# Patient Record
Sex: FEMALE | Race: WHITE | NOT HISPANIC OR LATINO | Employment: OTHER | URBAN - METROPOLITAN AREA
[De-identification: names, ages, dates, MRNs, and addresses within clinical notes are randomized per-mention and may not be internally consistent; named-entity substitution may affect disease eponyms.]

---

## 2017-08-25 ENCOUNTER — TELEPHONE (OUTPATIENT)
Dept: FAMILY MEDICINE | Facility: CLINIC | Age: 67
End: 2017-08-25

## 2017-08-25 ENCOUNTER — PATIENT MESSAGE (OUTPATIENT)
Dept: ADMINISTRATIVE | Facility: OTHER | Age: 67
End: 2017-08-25

## 2017-08-25 DIAGNOSIS — Z00.00 ROUTINE GENERAL MEDICAL EXAMINATION AT A HEALTH CARE FACILITY: Primary | ICD-10-CM

## 2017-08-25 NOTE — TELEPHONE ENCOUNTER
----- Message from Koki Rodriguez sent at 8/25/2017  7:32 AM CDT -----  Physical:  11/15  Labs:11/8, Please link annual fasting labs to appt

## 2017-10-31 ENCOUNTER — PATIENT MESSAGE (OUTPATIENT)
Dept: FAMILY MEDICINE | Facility: CLINIC | Age: 67
End: 2017-10-31

## 2017-12-04 ENCOUNTER — LAB VISIT (OUTPATIENT)
Dept: LAB | Facility: HOSPITAL | Age: 67
End: 2017-12-04
Attending: FAMILY MEDICINE
Payer: COMMERCIAL

## 2017-12-04 DIAGNOSIS — Z00.00 ROUTINE GENERAL MEDICAL EXAMINATION AT A HEALTH CARE FACILITY: ICD-10-CM

## 2017-12-04 LAB
ALBUMIN SERPL BCP-MCNC: 3.4 G/DL
ALP SERPL-CCNC: 61 U/L
ALT SERPL W/O P-5'-P-CCNC: 14 U/L
ANION GAP SERPL CALC-SCNC: 9 MMOL/L
AST SERPL-CCNC: 12 U/L
BASOPHILS # BLD AUTO: 0.03 K/UL
BASOPHILS NFR BLD: 0.5 %
BILIRUB SERPL-MCNC: 0.3 MG/DL
BUN SERPL-MCNC: 17 MG/DL
CALCIUM SERPL-MCNC: 9.4 MG/DL
CHLORIDE SERPL-SCNC: 108 MMOL/L
CHOLEST SERPL-MCNC: 200 MG/DL
CHOLEST/HDLC SERPL: 4 {RATIO}
CO2 SERPL-SCNC: 24 MMOL/L
CREAT SERPL-MCNC: 0.7 MG/DL
DIFFERENTIAL METHOD: ABNORMAL
EOSINOPHIL # BLD AUTO: 0.2 K/UL
EOSINOPHIL NFR BLD: 3.2 %
ERYTHROCYTE [DISTWIDTH] IN BLOOD BY AUTOMATED COUNT: 14 %
EST. GFR  (AFRICAN AMERICAN): >60 ML/MIN/1.73 M^2
EST. GFR  (NON AFRICAN AMERICAN): >60 ML/MIN/1.73 M^2
GLUCOSE SERPL-MCNC: 93 MG/DL
HCT VFR BLD AUTO: 34.9 %
HDLC SERPL-MCNC: 50 MG/DL
HDLC SERPL: 25 %
HGB BLD-MCNC: 11.6 G/DL
IMM GRANULOCYTES # BLD AUTO: 0.01 K/UL
IMM GRANULOCYTES NFR BLD AUTO: 0.2 %
LDLC SERPL CALC-MCNC: 133.2 MG/DL
LYMPHOCYTES # BLD AUTO: 2.4 K/UL
LYMPHOCYTES NFR BLD: 39.3 %
MCH RBC QN AUTO: 31.6 PG
MCHC RBC AUTO-ENTMCNC: 33.2 G/DL
MCV RBC AUTO: 95 FL
MONOCYTES # BLD AUTO: 0.5 K/UL
MONOCYTES NFR BLD: 8 %
NEUTROPHILS # BLD AUTO: 2.9 K/UL
NEUTROPHILS NFR BLD: 48.8 %
NONHDLC SERPL-MCNC: 150 MG/DL
NRBC BLD-RTO: 0 /100 WBC
PLATELET # BLD AUTO: 262 K/UL
PMV BLD AUTO: 10.4 FL
POTASSIUM SERPL-SCNC: 3.9 MMOL/L
PROT SERPL-MCNC: 7.1 G/DL
RBC # BLD AUTO: 3.67 M/UL
SODIUM SERPL-SCNC: 141 MMOL/L
TRIGL SERPL-MCNC: 84 MG/DL
TSH SERPL DL<=0.005 MIU/L-ACNC: 0.44 UIU/ML
WBC # BLD AUTO: 6.01 K/UL

## 2017-12-04 PROCEDURE — 84443 ASSAY THYROID STIM HORMONE: CPT

## 2017-12-04 PROCEDURE — 80061 LIPID PANEL: CPT

## 2017-12-04 PROCEDURE — 80053 COMPREHEN METABOLIC PANEL: CPT

## 2017-12-04 PROCEDURE — 85025 COMPLETE CBC W/AUTO DIFF WBC: CPT

## 2017-12-04 PROCEDURE — 36415 COLL VENOUS BLD VENIPUNCTURE: CPT | Mod: PO

## 2017-12-12 DIAGNOSIS — Z00.00 ROUTINE GENERAL MEDICAL EXAMINATION AT A HEALTH CARE FACILITY: Primary | ICD-10-CM

## 2018-02-01 ENCOUNTER — PATIENT MESSAGE (OUTPATIENT)
Dept: FAMILY MEDICINE | Facility: CLINIC | Age: 68
End: 2018-02-01

## 2018-02-01 ENCOUNTER — OFFICE VISIT (OUTPATIENT)
Dept: FAMILY MEDICINE | Facility: CLINIC | Age: 68
End: 2018-02-01
Payer: COMMERCIAL

## 2018-02-01 VITALS — SYSTOLIC BLOOD PRESSURE: 160 MMHG | HEART RATE: 76 BPM | TEMPERATURE: 98 F | DIASTOLIC BLOOD PRESSURE: 100 MMHG

## 2018-02-01 DIAGNOSIS — Z00.00 ROUTINE GENERAL MEDICAL EXAMINATION AT A HEALTH CARE FACILITY: Primary | ICD-10-CM

## 2018-02-01 DIAGNOSIS — J06.9 UPPER RESPIRATORY TRACT INFECTION, UNSPECIFIED TYPE: ICD-10-CM

## 2018-02-01 DIAGNOSIS — Z23 NEED FOR PROPHYLACTIC VACCINATION AND INOCULATION AGAINST INFLUENZA: ICD-10-CM

## 2018-02-01 DIAGNOSIS — M85.80 OSTEOPENIA, UNSPECIFIED LOCATION: ICD-10-CM

## 2018-02-01 DIAGNOSIS — I10 HTN (HYPERTENSION), BENIGN: ICD-10-CM

## 2018-02-01 DIAGNOSIS — Z28.39 IMMUNIZATION DEFICIENCY: ICD-10-CM

## 2018-02-01 DIAGNOSIS — Z85.850 HX OF THYROID CANCER: ICD-10-CM

## 2018-02-01 DIAGNOSIS — Z91.89 FRAMINGHAM CARDIAC RISK 10-20% IN NEXT 10 YEARS: ICD-10-CM

## 2018-02-01 DIAGNOSIS — I11.9 HYPERTENSIVE LEFT VENTRICULAR HYPERTROPHY, WITHOUT HEART FAILURE: ICD-10-CM

## 2018-02-01 PROCEDURE — 99397 PER PM REEVAL EST PAT 65+ YR: CPT | Mod: S$GLB,,, | Performed by: FAMILY MEDICINE

## 2018-02-01 PROCEDURE — 93010 ELECTROCARDIOGRAM REPORT: CPT | Mod: S$GLB,,, | Performed by: INTERNAL MEDICINE

## 2018-02-01 PROCEDURE — 93005 ELECTROCARDIOGRAM TRACING: CPT | Mod: S$GLB,,, | Performed by: FAMILY MEDICINE

## 2018-02-01 PROCEDURE — 99999 PR PBB SHADOW E&M-EST. PATIENT-LVL III: CPT | Mod: PBBFAC,,, | Performed by: FAMILY MEDICINE

## 2018-02-01 PROCEDURE — 90662 IIV NO PRSV INCREASED AG IM: CPT | Mod: S$GLB,,, | Performed by: FAMILY MEDICINE

## 2018-02-01 PROCEDURE — 90471 IMMUNIZATION ADMIN: CPT | Mod: S$GLB,,, | Performed by: FAMILY MEDICINE

## 2018-02-01 PROCEDURE — 90670 PCV13 VACCINE IM: CPT | Mod: S$GLB,,, | Performed by: FAMILY MEDICINE

## 2018-02-01 PROCEDURE — 90472 IMMUNIZATION ADMIN EACH ADD: CPT | Mod: S$GLB,,, | Performed by: FAMILY MEDICINE

## 2018-02-01 RX ORDER — NAPROXEN SODIUM 220 MG/1
81 TABLET, FILM COATED ORAL DAILY
Refills: 0 | COMMUNITY
Start: 2018-02-01 | End: 2022-06-23

## 2018-02-01 RX ORDER — LOSARTAN POTASSIUM 50 MG/1
50 TABLET ORAL DAILY
Qty: 90 TABLET | Refills: 0 | Status: SHIPPED | OUTPATIENT
Start: 2018-02-01 | End: 2018-04-16 | Stop reason: SDUPTHER

## 2018-02-01 NOTE — PROGRESS NOTES
Subjective:      Patient ID: Nurys Do is a 67 y.o. female.    Chief Complaint: Annual Exam    Here today for general exam.     Her BP has been elevated on several occassions & with recent R foot surgery, but never treated before with HTN medication. She does feel a little fatigued.     She is not exercising after her R foot surgery 2 months ago with Dr Garcia    Over the past few weeks she has mild postnasal drip, congestion, no fever chills cough    Denies any chest pain, shortness of breath,  nausea vomiting constipation diarrhea, blood in stool, heartburn    The 10-year ASCVD risk score (Adeshannon LEMUS Jr., et al., 2013) is: 17.6%    Values used to calculate the score:      Age: 67 years      Sex: Female      Is Non- : No      Diabetic: No      Tobacco smoker: No      Systolic Blood Pressure: 160 mmHg      Is BP treated: Yes      HDL Cholesterol: 50 mg/dL      Total Cholesterol: 200 mg/dL      No results found for: HGBA1C  No results found for: MICALBCREAT  Lab Results   Component Value Date    LDLCALC 133.2 12/04/2017    LDLCALC 118.8 10/30/2015    CHOL 200 (H) 12/04/2017    HDL 50 12/04/2017    TRIG 84 12/04/2017       Lab Results   Component Value Date     12/04/2017    K 3.9 12/04/2017     12/04/2017    CO2 24 12/04/2017    GLU 93 12/04/2017    BUN 17 12/04/2017    CREATININE 0.7 12/04/2017    CALCIUM 9.4 12/04/2017    PROT 7.1 12/04/2017    ALBUMIN 3.4 (L) 12/04/2017    BILITOT 0.3 12/04/2017    ALKPHOS 61 12/04/2017    AST 12 12/04/2017    ALT 14 12/04/2017    ANIONGAP 9 12/04/2017    ESTGFRAFRICA >60.0 12/04/2017    EGFRNONAA >60.0 12/04/2017    WBC 6.01 12/04/2017    HGB 11.6 (L) 12/04/2017    HCT 34.9 (L) 12/04/2017    MCV 95 12/04/2017     12/04/2017    TSH 0.440 12/04/2017    [id number] 33 11/18/2013         Current Outpatient Prescriptions on File Prior to Visit   Medication Sig    fluticasone (FLONASE) 50 mcg/actuation nasal spray instill 2 sprays into  each nostril once daily    levothyroxine (SYNTHROID) 200 MCG tablet Take 1 tablet (200 mcg total) by mouth before breakfast.     No current facility-administered medications on file prior to visit.      Past Medical History:   Diagnosis Date    Diverticulosis     colonoscopy 2011, Dr Guerrier    [place] cardiac risk 10-20% in next 10 years 2/1/2018    HTN (hypertension), benign 2/1/2018    Hypertensive left ventricular hypertrophy, without heart failure 2/1/2018    Knee pain, left     eval in PT with pes planus, L foot, hip & knee pain, Dr Tirado takes Meloxicam 15 mg daily    Osteopenia 1/6/2016    Thyroid cancer     Dr Quinn     Past Surgical History:   Procedure Laterality Date    right foot surgery  2017    Dr Garcia    THYROIDECTOMY      x2, Dr Quinn     Social History     Social History Narrative    , 5 children, nonsmoker, ETOH socially, GYN here nml 2014, colonoscopy with diverticulosis 2011 Dr Guerrier     Family History   Problem Relation Age of Onset    Hypertension Mother     Dementia Mother     Colon cancer Father     Heart disease Father      Vitals:    02/01/18 0910 02/01/18 0946   BP: (!) 162/96 (!) 160/100   Pulse: 76    Temp: 98.4 °F (36.9 °C)      Objective:   Physical Exam   Constitutional: She is oriented to person, place, and time. She appears well-developed and well-nourished. No distress.   HENT:   Head: Normocephalic and atraumatic.   Right Ear: Tympanic membrane and external ear normal.   Left Ear: Tympanic membrane and external ear normal.   Nose: Nose normal. Right sinus exhibits no maxillary sinus tenderness and no frontal sinus tenderness. Left sinus exhibits no maxillary sinus tenderness and no frontal sinus tenderness.   Mouth/Throat: Oropharynx is clear and moist and mucous membranes are normal. No oropharyngeal exudate.   Eyes: EOM are normal. Pupils are equal, round, and reactive to light.   Neck: Normal range of motion. Neck supple. No thyromegaly present.    Cardiovascular: Normal rate, regular rhythm, normal heart sounds and intact distal pulses.    No murmur heard.  Pulmonary/Chest: Effort normal and breath sounds normal. She has no wheezes. She has no rales.   Abdominal: Soft. Bowel sounds are normal. She exhibits no distension and no mass. There is no tenderness. There is no rebound and no guarding.   Musculoskeletal: She exhibits no edema.   R foot in walking boot   Lymphadenopathy:     She has no cervical adenopathy.   Neurological: She is alert and oriented to person, place, and time.   Skin: Skin is warm and dry.   Psychiatric: She has a normal mood and affect. Her behavior is normal.   Nursing note and vitals reviewed.    Assessment:     1. Routine general medical examination at a health care facility    2. HTN (hypertension), benign    3. Hx of thyroid cancer    4. Osteopenia, unspecified location    5. [place] cardiac risk 10-20% in next 10 years    6. Upper respiratory tract infection, unspecified type    7. Immunization deficiency    8. Need for prophylactic vaccination and inoculation against influenza    9. Hypertensive left ventricular hypertrophy, without heart failure      Plan:     Orders Placed This Encounter    (In Office Administered) Pneumococcal Conjugate Vaccine (13 Valent) (IM)    Flu Vaccine - High Dose (PF) (65+)    Ambulatory referral to Cardiology    EKG 12-lead    losartan (COZAAR) 50 MG tablet    aspirin 81 MG Chew     Follow with Endocrinologist  Patient Instructions   See me one month for BP check on new med    Due for  Vaccines  - tetanus, pneumonia, flu    Can use Flonase twice a day and plain Claritin at night.     ==============================================================  ==============================================  FOR HIGH BLOOD PRESSURE (HYPERTENSION)-------------    Take your Blood pressure medication every day     Try to stop these things that can elevate blood pressuere: salt, caffeine, energy drinks, diet  pills, sudafed, alcohol , taking NSAIDS daily (advil, alleve, ibuprofen, naproxen) and birth control    DECREASE ALCOHOL CONSUMPTION    DECREASE SALT (fast foods, frozen, canned, processed foods, ham, turkey, fried foods, chips, crackers, etc) & drink 8 glasses of water a day with minimal caffeine ( 1 cup a day)   ==============================================    ==========================  Your 10 year risk of future heart attack / stroke ([place] risk)  - is 13.5%    Goal is < 10%    If your risk is > 10%, I  recommend statin medication daily (lipitor, pravachol, crestor)     ACC/AHA , ages 40 to 75 , treat if ?7.5% risk  [place]'s National [place] for Health and Clinical Excellence (NICE) recommend statin medication (lipitor, pravachol, crestor) , treat if > 10%     ====================    Increase FIBER INTAKE - your body is happiest with FIVE FRESH COLORS of fruits and  vegetables DAILY    With respect to FIBER intake, you should have 5-10 grams of viscous soluble fiber daily. This  Includes fruit (bananas, apples, pears, blackberries, citrus, peaches, plums, nectarines), whole grains (oatmeal, oat bran, all-bran cereal, granola, shredded wheat, wheat germ, henry barley, brown rice), legumes (northern/ayala/navy/lima/kidney/black beans, peas, lentils), seeds (psyllium), and vegetables (carrots, broccoli, brussels sprouts, artichokes).     -------------------------------------------------------------------------------------------------------    Consider watching the movies to learn how our body processes American food:    FORKS OVER KNIVES    FAT SICK and NEARLY DEAD     SUPERSIZE ME    BOOK RECOMMENDATIONS: Prevent and Reverse Heart Disease,  by Mery Uribe MD      ================  I'd like to advise you on current CANCER SCREENING recommendations:  ~~~~~~~~~~~~~~~~~~~~~~~~~~~~~~~~~~~~~~~~~~~~~~~~~~~~~~~~~~~~~   If all previous PAP SMEARS have been normal, no current problems, and no  family history of female cancers, it is recommended to have Pap smear every 3 years (or after 29 yo, every 5 years with HPV co-testing).   MAMMOGRAM  every 1-2 years, from 50 - 74 years old. We can discuss your risk at 40 & determine whether to get mammogram sooner  Of course, I can perform this sooner if there is family history of cancer, or if you have problems or questions    RECOMMENDATIONS FOR FEMALES    Prevent the #1 cause of death- cardiovascular disease (HEART ATTACK & STROKE) by checking for normal blood pressure, cholesterol, sugars, & by not smoking.     VACCINES: Yearly FLU shot, ONE PNEUMONIA shot after 65, ONE SHINGLES shot after 60    Screening colonoscopy at AGE  50 & every 10 years to check for COLON CANCER,  one of the most common & preventable cancers. Or FIT z12.11, Repeat in 3 years if POLYP found     I recommend  high fiber (5 fresh fruits or vegetables daily), low fat diet and aerobic  exercise (huffing/ puffing/ sweating for 20 min straight at least 4 days a week)    Follow up yearly with mammogram (every 2 years) , fasting lipids, CMP, CBC, TSH prior.   ==============================================================                                  Answers for HPI/ROS submitted by the patient on 1/29/2018   activity change: Yes  unexpected weight change: Yes  neck pain: No  hearing loss: No  rhinorrhea: No  trouble swallowing: No  eye discharge: No  visual disturbance: No  chest tightness: No  wheezing: No  chest pain: No  palpitations: Yes  blood in stool: No  constipation: No  vomiting: No  diarrhea: No  polydipsia: No  difficulty urinating: No  hematuria: No  menstrual problem: No  dysuria: No  joint swelling: Yes  arthralgias: Yes  headaches: No  weakness: Yes  confusion: No  dysphoric mood: No

## 2018-02-01 NOTE — PROGRESS NOTES
Two patient identifiers used, allergies reviewed, vaccines confirmed.  Prevnar/13 pneumococcal conjugate vaccine administered IM right deltoid.  Flu consent signed by patient. Flu vaccine administered IM left deltoid.  Pt tolerated both injections well, no redness or bruising at either injection site.  Pt advised to remain in clinic 15 mins following injections for observation.

## 2018-02-01 NOTE — PATIENT INSTRUCTIONS
See me one month for BP check on new med    Due for  Vaccines  - tetanus, pneumonia, flu    Can use Flonase twice a day and plain Claritin at night.     ==============================================================  ==============================================  FOR HIGH BLOOD PRESSURE (HYPERTENSION)-------------    Take your Blood pressure medication every day     Try to stop these things that can elevate blood pressuere: salt, caffeine, energy drinks, diet pills, sudafed, alcohol , taking NSAIDS daily (advil, alleve, ibuprofen, naproxen) and birth control    DECREASE ALCOHOL CONSUMPTION    DECREASE SALT (fast foods, frozen, canned, processed foods, ham, turkey, fried foods, chips, crackers, etc) & drink 8 glasses of water a day with minimal caffeine ( 1 cup a day)   ==============================================    ==========================  Your 10 year risk of future heart attack / stroke ([place] risk)  - is 13.5%    Goal is < 10%    If your risk is > 10%, I  recommend statin medication daily (lipitor, pravachol, crestor)     ACC/AHA , ages 40 to 75 , treat if ?7.5% risk  [place]'s National [place] for Health and Clinical Excellence (NICE) recommend statin medication (lipitor, pravachol, crestor) , treat if > 10%     ====================    Increase FIBER INTAKE - your body is happiest with FIVE FRESH COLORS of fruits and  vegetables DAILY    With respect to FIBER intake, you should have 5-10 grams of viscous soluble fiber daily. This  Includes fruit (bananas, apples, pears, blackberries, citrus, peaches, plums, nectarines), whole grains (oatmeal, oat bran, all-bran cereal, granola, shredded wheat, wheat germ, henry barley, brown rice), legumes (northern/ayala/navy/lima/kidney/black beans, peas, lentils), seeds (psyllium), and vegetables (carrots, broccoli, brussels sprouts, artichokes).      -------------------------------------------------------------------------------------------------------    Consider watching the movies to learn how our body processes American food:    FORKS OVER KNIVES    FAT SICK and NEARLY DEAD     SUPERSIZE ME    BOOK RECOMMENDATIONS: Prevent and Reverse Heart Disease,  by Mery Uribe MD      ================  I'd like to advise you on current CANCER SCREENING recommendations:  ~~~~~~~~~~~~~~~~~~~~~~~~~~~~~~~~~~~~~~~~~~~~~~~~~~~~~~~~~~~~~   If all previous PAP SMEARS have been normal, no current problems, and no family history of female cancers, it is recommended to have Pap smear every 3 years (or after 31 yo, every 5 years with HPV co-testing).   MAMMOGRAM  every 1-2 years, from 50 - 74 years old. We can discuss your risk at 40 & determine whether to get mammogram sooner  Of course, I can perform this sooner if there is family history of cancer, or if you have problems or questions    RECOMMENDATIONS FOR FEMALES    Prevent the #1 cause of death- cardiovascular disease (HEART ATTACK & STROKE) by checking for normal blood pressure, cholesterol, sugars, & by not smoking.     VACCINES: Yearly FLU shot, ONE PNEUMONIA shot after 65, ONE SHINGLES shot after 60    Screening colonoscopy at AGE  50 & every 10 years to check for COLON CANCER,  one of the most common & preventable cancers. Or FIT z12.11, Repeat in 3 years if POLYP found     I recommend  high fiber (5 fresh fruits or vegetables daily), low fat diet and aerobic  exercise (huffing/ puffing/ sweating for 20 min straight at least 4 days a week)    Follow up yearly with mammogram (every 2 years) , fasting lipids, CMP, CBC, TSH prior.   ==============================================================

## 2018-02-15 ENCOUNTER — OFFICE VISIT (OUTPATIENT)
Dept: CARDIOLOGY | Facility: CLINIC | Age: 68
End: 2018-02-15
Payer: COMMERCIAL

## 2018-02-15 VITALS
DIASTOLIC BLOOD PRESSURE: 90 MMHG | SYSTOLIC BLOOD PRESSURE: 134 MMHG | HEIGHT: 62 IN | HEART RATE: 80 BPM | BODY MASS INDEX: 37.3 KG/M2 | WEIGHT: 202.69 LBS

## 2018-02-15 DIAGNOSIS — Z91.89 AT RISK FOR CORONARY ARTERY DISEASE: Primary | ICD-10-CM

## 2018-02-15 DIAGNOSIS — E78.00 HYPERCHOLESTEREMIA: ICD-10-CM

## 2018-02-15 DIAGNOSIS — R06.02 SHORT OF BREATH ON EXERTION: ICD-10-CM

## 2018-02-15 DIAGNOSIS — R94.31 ABNORMAL ECG: ICD-10-CM

## 2018-02-15 DIAGNOSIS — I10 HYPERTENSION, ESSENTIAL: ICD-10-CM

## 2018-02-15 PROCEDURE — 99244 OFF/OP CNSLTJ NEW/EST MOD 40: CPT | Mod: S$GLB,,, | Performed by: INTERNAL MEDICINE

## 2018-02-15 PROCEDURE — 99999 PR PBB SHADOW E&M-EST. PATIENT-LVL III: CPT | Mod: PBBFAC,,, | Performed by: INTERNAL MEDICINE

## 2018-02-15 NOTE — Clinical Note
Dear Meeta, Thank you for referring Nurys Do  to the [facility] at [place]. My note is attached. I'd like her to get a coronary calcium score. Homeyaarturo

## 2018-02-15 NOTE — PROGRESS NOTES
"Subjective:     Patient ID:  Nurys Do is a 67 y.o. female who presents for evaluation of Hypertension      Problem List:  Hypertension  Overweight BMI >35    HPI:     Nurys Do is Romie Do' wife. She is being referred by Dr. Maciel. She is being referred for evaluation of hypertension.  Blood pressure was 160/100 mm Hg in Dr. Maciel's office recently. She was prescribed losartan 50 mg 5 days ago. She recalls that her BP was 190 mm Hg in 11/17 while she was having ankle and foot surgery. She was taking Percocet but it made her sick and had switched to ibuprofen 400 mg bid for 10 days.   She reports heart burn described as pressure. It occurs a "couple of times a year". Fried foods and sugar seem to bring it on. She reports shortness of breath on exertion since the summer of 2017. 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 She was swimming during the summer. She was unable to exercise because of pain in her ankle. Weight has increased from 187 to 202 lbs (todays yovani includes an orthopedic ankle boot).  Total cholesterol has been 185-200 with an HDL in the low 50s and LDL of 119-133 mg/dl.  Her 10 year risk of MI was calculated to be 17%.  She does not take a statin.  Recent ECG revealed sinus rhythm with LVH and a Q wave in lead III.      Review of Systems   Constitution: Positive for weakness and weight gain. Negative for malaise/fatigue and weight loss.   HENT: Negative for hearing loss and nosebleeds.    Cardiovascular: Positive for dyspnea on exertion and leg swelling. Negative for chest pain, claudication, irregular heartbeat, near-syncope, palpitations and syncope.   Respiratory: Negative for cough, hemoptysis, sputum production and wheezing.    Hematologic/Lymphatic: Does not bruise/bleed easily.   Musculoskeletal: Positive for arthritis, muscle cramps and muscle weakness. Negative for back pain, joint pain and joint swelling. " "  Gastrointestinal: Positive for heartburn. Negative for abdominal pain, change in bowel habit and melena.   Genitourinary: Positive for frequency and nocturia. Negative for hematuria.   Neurological: Negative for dizziness, headaches, light-headedness, loss of balance, numbness and paresthesias.   Psychiatric/Behavioral: Negative for depression. The patient is not nervous/anxious.          Current Outpatient Prescriptions   Medication Sig    aspirin 81 MG Chew Take 1 tablet (81 mg total) by mouth once daily.    fluticasone (FLONASE) 50 mcg/actuation nasal spray instill 2 sprays into each nostril once daily    levothyroxine (SYNTHROID) 200 MCG tablet Take 1 tablet (200 mcg total) by mouth before breakfast.    losartan (COZAAR) 50 MG tablet Take 1 tablet (50 mg total) by mouth once daily.     No current facility-administered medications for this visit.          Past Medical History:   Diagnosis Date    Diverticulosis     colonoscopy 2011, Dr Guerrier    [place] cardiac risk 10-20% in next 10 years 2/1/2018    HTN (hypertension), benign 2/1/2018    Hypertensive left ventricular hypertrophy, without heart failure 2/1/2018    Knee pain, left     eval in PT with pes planus, L foot, hip & knee pain, Dr Tirado takes Meloxicam 15 mg daily    Osteopenia 1/6/2016    Thyroid cancer     Dr Quinn         Social History   Substance Use Topics    Smoking status: Never Smoker    Smokeless tobacco: Never Used    Alcohol use Yes         Family History   Problem Relation Age of Onset    Hypertension Mother     Dementia Mother     Colon cancer Father     Heart disease Father          Objective:     Physical Exam   Constitutional: She is oriented to person, place, and time. She appears well-developed and well-nourished.   BP (!) 134/90   Pulse 80   Ht 5' 2" (1.575 m)   Wt 92 kg (202 lb 11.4 oz)   BMI 37.08 kg/m²  - todays yovani includes an orthopedic ankle boot.     HENT:   Head: Normocephalic.   Neck: No JVD " present. Carotid bruit is not present.   Cardiovascular: Normal rate, regular rhythm, S1 normal and S2 normal.  Exam reveals no gallop.    No murmur heard.  Pulses:       Radial pulses are 2+ on the right side, and 2+ on the left side.        Posterior tibial pulses are 1+ on the right side, and 1+ on the left side.   Pulmonary/Chest: Effort normal. She has no wheezes. She has no rales. Chest wall is not dull to percussion.   Abdominal: Soft. She exhibits no abdominal bruit. There is no splenomegaly or hepatomegaly. There is no tenderness.   Musculoskeletal:        Right lower leg: She exhibits no edema.        Left lower leg: She exhibits no edema.   Neurological: She is alert and oriented to person, place, and time. Gait normal.   Skin: Skin is warm and dry. No bruising and no rash noted. No cyanosis. Nails show no clubbing.   Psychiatric: She has a normal mood and affect. Her speech is normal and behavior is normal. Judgment normal. Cognition and memory are normal.         Lab Results   Component Value Date    CHOL 200 (H) 12/04/2017    CHOL 185 10/30/2015    CHOL 206 (H) 11/18/2013     Lab Results   Component Value Date    HDL 50 12/04/2017    HDL 51 10/30/2015    HDL 55 11/18/2013     Lab Results   Component Value Date    LDLCALC 133.2 12/04/2017    LDLCALC 118.8 10/30/2015    LDLCALC 135.4 11/18/2013     Lab Results   Component Value Date    TRIG 84 12/04/2017    TRIG 76 10/30/2015    TRIG 78 11/18/2013     Lab Results   Component Value Date    CHOLHDL 25.0 12/04/2017    CHOLHDL 27.6 10/30/2015    CHOLHDL 26.7 11/18/2013     Lab Results   Component Value Date    ALT 14 12/04/2017     Lab Results   Component Value Date    ALT 14 12/04/2017    AST 12 12/04/2017    ALKPHOS 61 12/04/2017    BILITOT 0.3 12/04/2017      Lab Results   Component Value Date    CREATININE 0.7 12/04/2017    BUN 17 12/04/2017     12/04/2017    K 3.9 12/04/2017     12/04/2017    CO2 24 12/04/2017       Recent ECG revealed sinus  rhythm with LVH and a Q wave in lead III.      Assessment and Plan:     Coronary risk factors  -     She should take a statin. I would modify the dose based on a coronary calcium score.   CT Calcium Scoring Cardiac; Future    Short of breath on exertion and Abnormal ECG - Q in lead III   R/O obstructive CAD, pulmonary hypertension, diastolic dysfunction, LV dysfunction  -     Dobutamine Stress Test w/ Color Flow; Future    Hypercholesteremia   As above.    Hypertension, essential   SBP goal <130 mm Hg. check blood pressure at home and review in 2-3 weeks.       Follow-up in about 1 year (around 2/15/2019).

## 2018-02-15 NOTE — LETTER
February 18, 2018      Meeta Maciel MD  [address]  [address]  [facility] [address]           [place] [facility] Cardiology  [address] [place] [address]  Phone: [phone]          Patient: Nurys Do   MR Number: [id number]   YOB: 1950   Date of Visit: 2/15/2018       Dear Dr. Meeta Maciel:    Thank you for referring Nurys Do to me for evaluation. Attached you will find relevant portions of my assessment and plan of care.    If you have questions, please do not hesitate to call me. I look forward to following Nurys Do along with you.    Sincerely,    Mindy Ellsworth MD    Enclosure  CC:  No Recipients    If you would like to receive this communication electronically, please contact [email or web address] or [phone] to request more information on [facility] access.    For providers and/or their staff who would like to refer a patient to [facility], please contact us through our one-stop-shop provider referral line, [facility] , at [phone].    If you feel you have received this communication in error or would no longer like to receive these types of communications, please e-mail [email or web address]

## 2018-02-15 NOTE — PATIENT INSTRUCTIONS
Omron series 5, 7 or 10 that fits on the arm and has a wide range D ring cuff.  Check your BP at home for 2-3 weeks and send us the readings.  Ideally most SBP should be <130 mm Hg.  Based on a compilation of your risk factors, you should take a statin and low dose aspirin. Lets wait and see what the tests show.     ================    If you have severe pain you can take NSAIDs for a short period of times: Ibuprofen, Advil, Motrin, Aleeve, Naproxen, Meloxicam, Mobic, Diclofenac and Voltaren.  Aleeve (naproxen) is safer from the cardiac stand point but should be avoided if kidney function is not normal.  Celebrex is a prescription REYES 2 inhibitor similar to NSAIDs- same restrictions apply.  Make sure you drink extra water on the days that you take the NSAIDs or Celebrex. Check above the ankles to make sure you are not retaining fluid.  Also do not take NSAIDs at the same time as aspirin - it may make aspirin less effective.  You can take Tylenol for pain. It is not a NSAID. Limit Tylenol to 4 tabs (500 mg each) in a 24 hr period.     ==============    LDL - bad type - improves with diet and medications: typically statins; most other medications that lower LDL have not been proven to prevent heart attacks.  May not improve significantly with exercise alone.  Ideally less than 100 mg/dl. If you have coronary artery disease, this should be well below 70 mg/dl.    HDL - good type - improves with exercise.  Ideally greater than 50 mg/dl.    TGs (triglycerides) - also bad - can change very quickly and considerably with certain foods. Improve with diet and exercise  In some cases a low carbohydrate diet will lower TGs better than a low fat diet.  Ideal range  mg/dl.    Sugar, fat and cholesterol in food:     A sensible diet that limits the intake of sugars, saturated (bad) fats and trans fats while increasing the intake of unsaturated (good)  fats and plant proteins is the basis of the current dietary  recommendations.      We now recommend drastically reducing the intake of sugar. There is less emphasis on excluding fat.     Cholesterol in our food is no longer a significant concern, mainly because it is generally present in small amounts. However please do not confuse this with the role of cholesterol in our blood and arteries. Make no mistake, it is cholesterol that clogs up our arteries whether it comes from our food or is manufactured by our bodies.       Most foods that are high in cholesterol are also high in saturated fat. But there is way more saturated fat than cholesterol in these foods. So its the saturated fat content that matters more than cholesterol. On the other hand there are a handful of foods that are high in cholesterol but do not contain much saturated fat: eggs, shrimp, crab legs and crawfish are OK to eat.       Saturated fat is the bad fat - you should limit your intake of this. Deep fried foods, meats and other animal fats are high in saturated fat. Cookies, donuts and most dessert and cakes are usually high in both saturated fat and sugar.       Unsaturated fat is the good fat. It contains the same number of calories as saturated fat but these fats do not get deposited in our arteries. The Mediterranean style diet encourages the intake of unsaturated fat - olive oil, avocado and unsalted nuts. So instead of baking a piece of fish, it may be better to pan-chaves it using olive oil.     You should eat a few servings of vegetables (and fruit as long as you are not diabetic) everyday. Substitute some plant proteins in place of meat: beans, lentils, quinoa and oatmeal. They are lean proteins.     Do not use stick butter or stick margarine. Butter that comes in a tub is soft butter. It consists of 1/2 butter and 1/2 vegetable oil., either canola or olive oil It is fine to use that.       Trans fats should definitely be avoided. Most foods that are labelled as containing 0 gms of trans fat can  still contain several hundred milligrams of trans fat: creamer, margarine, refrigerator dough, deep fried foods, ready made frosting, potato, corn and torilla chips, cakes, cookies, pie crusts and crackers containing shortening made with hydrogenated vegetable oil.

## 2018-02-19 DIAGNOSIS — I10 ESSENTIAL HYPERTENSION: Primary | ICD-10-CM

## 2018-02-19 DIAGNOSIS — E78.00 HYPERCHOLESTEROLEMIA: ICD-10-CM

## 2018-03-01 ENCOUNTER — OFFICE VISIT (OUTPATIENT)
Dept: FAMILY MEDICINE | Facility: CLINIC | Age: 68
End: 2018-03-01
Payer: COMMERCIAL

## 2018-03-01 VITALS
BODY MASS INDEX: 36.76 KG/M2 | WEIGHT: 199.75 LBS | SYSTOLIC BLOOD PRESSURE: 120 MMHG | TEMPERATURE: 98 F | DIASTOLIC BLOOD PRESSURE: 78 MMHG | HEIGHT: 62 IN | HEART RATE: 84 BPM

## 2018-03-01 DIAGNOSIS — Z91.89 FRAMINGHAM CARDIAC RISK 10-20% IN NEXT 10 YEARS: ICD-10-CM

## 2018-03-01 DIAGNOSIS — I11.9 HYPERTENSIVE LEFT VENTRICULAR HYPERTROPHY, WITHOUT HEART FAILURE: Primary | ICD-10-CM

## 2018-03-01 DIAGNOSIS — Z85.850 HX OF THYROID CANCER: ICD-10-CM

## 2018-03-01 PROBLEM — I10 HYPERTENSION, ESSENTIAL: Status: RESOLVED | Noted: 2018-02-01 | Resolved: 2018-03-01

## 2018-03-01 PROCEDURE — 99999 PR PBB SHADOW E&M-EST. PATIENT-LVL III: CPT | Mod: PBBFAC,,, | Performed by: FAMILY MEDICINE

## 2018-03-01 PROCEDURE — 3078F DIAST BP <80 MM HG: CPT | Mod: S$GLB,,, | Performed by: FAMILY MEDICINE

## 2018-03-01 PROCEDURE — 99212 OFFICE O/P EST SF 10 MIN: CPT | Mod: S$GLB,,, | Performed by: FAMILY MEDICINE

## 2018-03-01 PROCEDURE — 3074F SYST BP LT 130 MM HG: CPT | Mod: S$GLB,,, | Performed by: FAMILY MEDICINE

## 2018-03-01 NOTE — PATIENT INSTRUCTIONS
==============================================  FOR HIGH BLOOD PRESSURE (HYPERTENSION)-------------    Take your Blood pressure medication every day , BP stable    Try to stop these things that can elevate blood pressuere: salt, caffeine, energy drinks, diet pills, sudafed, alcohol , taking NSAIDS daily (advil, alleve, ibuprofen, naproxen) and birth control    DECREASE ALCOHOL CONSUMPTION    DECREASE SALT (fast foods, frozen, canned, processed foods, ham, turkey, fried foods, chips, crackers, etc) & drink 8 glasses of water a day with minimal caffeine ( 1 cup a day)   ==============================================    See me yearly with labs prior

## 2018-03-06 ENCOUNTER — HOSPITAL ENCOUNTER (OUTPATIENT)
Dept: RADIOLOGY | Facility: HOSPITAL | Age: 68
Discharge: HOME | End: 2018-03-06
Attending: INTERNAL MEDICINE
Payer: COMMERCIAL

## 2018-03-06 ENCOUNTER — CLINICAL SUPPORT (OUTPATIENT)
Dept: CARDIOLOGY | Facility: CLINIC | Age: 68
End: 2018-03-06
Attending: INTERNAL MEDICINE
Payer: COMMERCIAL

## 2018-03-06 DIAGNOSIS — I10 HYPERTENSION, ESSENTIAL: ICD-10-CM

## 2018-03-06 DIAGNOSIS — R94.31 ABNORMAL ECG: ICD-10-CM

## 2018-03-06 DIAGNOSIS — Z91.89 AT RISK FOR CORONARY ARTERY DISEASE: ICD-10-CM

## 2018-03-06 LAB
ESTIMATED PA SYSTOLIC PRESSURE: 30.47
MITRAL VALVE REGURGITATION: NORMAL
RETIRED EF AND QEF - SEE NOTES: 55 (ref 55–65)
TRICUSPID VALVE REGURGITATION: NORMAL

## 2018-03-06 PROCEDURE — 93325 DOPPLER ECHO COLOR FLOW MAPG: CPT | Mod: S$GLB,,, | Performed by: INTERNAL MEDICINE

## 2018-03-06 PROCEDURE — 93351 STRESS TTE COMPLETE: CPT | Mod: S$GLB,,, | Performed by: INTERNAL MEDICINE

## 2018-03-06 PROCEDURE — 75571 CT HRT W/O DYE W/CA TEST: CPT | Mod: TC

## 2018-03-06 PROCEDURE — 75571 CT HRT W/O DYE W/CA TEST: CPT | Mod: 26,,, | Performed by: RADIOLOGY

## 2018-03-06 PROCEDURE — 93320 DOPPLER ECHO COMPLETE: CPT | Mod: S$GLB,,, | Performed by: INTERNAL MEDICINE

## 2018-03-07 ENCOUNTER — TELEPHONE (OUTPATIENT)
Dept: CARDIOLOGY | Facility: CLINIC | Age: 68
End: 2018-03-07

## 2018-03-07 ENCOUNTER — PATIENT MESSAGE (OUTPATIENT)
Dept: CARDIOLOGY | Facility: CLINIC | Age: 68
End: 2018-03-07

## 2018-03-07 DIAGNOSIS — E78.00 HYPERCHOLESTEROLEMIA: Primary | ICD-10-CM

## 2018-03-07 RX ORDER — ATORVASTATIN CALCIUM 20 MG/1
20 TABLET, FILM COATED ORAL DAILY
Qty: 90 TABLET | Refills: 3 | Status: SHIPPED | OUTPATIENT
Start: 2018-03-07 | End: 2019-03-05 | Stop reason: SDUPTHER

## 2018-03-07 RX ORDER — ATORVASTATIN CALCIUM 20 MG/1
20 TABLET, FILM COATED ORAL DAILY
COMMUNITY
End: 2018-03-07 | Stop reason: SDUPTHER

## 2018-03-07 NOTE — TELEPHONE ENCOUNTER
----- Message from Mindy Ellsworth MD sent at 3/7/2018  7:45 AM CST -----  The stress test was normal, ie no suggestion of any severe blockages in the heart. The CT scan showed a score of ~175. That means that there is some calcified plaque, ie hardening of the arteries, in the heart. Because the score is not 0, she should take a statin. Recommend 20 mg of Lipitor and continue baby aspirin. If patient agrees, pl send me a refill request. Labs in 3 mo.  RTC in 1 yr

## 2018-03-09 ENCOUNTER — TELEPHONE (OUTPATIENT)
Dept: CARDIOLOGY | Facility: CLINIC | Age: 68
End: 2018-03-09

## 2018-03-09 NOTE — TELEPHONE ENCOUNTER
"Reviewed BPs. No change in meds. I understand she was feeling "foggy" and tired by the middle of the day. Unless this is intolerable, suggest that she stick with this a while and see if her body can adjust to the medicine, if not I will reduce the dose but then her BP will surely increase.  "

## 2018-03-09 NOTE — TELEPHONE ENCOUNTER
"----- Message from Marii Gaytan LPN sent at 3/9/2018  4:37 PM CST -----  Patient Email  Open      3/7/2018  [facility]     Mindy Ellsworth MD   Cardiology   Conversation: [facility]   (Newest Message First)   2018              4:06 PM   Mindy Ellsworth MD routed this conversation to Me   Mindy Ellsworth MD          4:02 PM   Note        Reviewed BPs. No change in meds. I understand she was feeling "foggy" and tired by the middle of the day. Unless this is intolerable, suggest that she stick with this a while and see if her body can adjust to the medicine, if not I will reduce the dose but then her BP will surely increase.    2018              3:46 PM   You routed this conversation to Mindy Ellsworth MD   Nurys Do   to Mindy Ellsworth MD           3:28 PM   From: MaryLee Berner Harris (1950)    This encounter is not signed. The conversation may still be ongoing.   Additional Documentation     Encounter Info:   Billing Info,   History,   Detailed Report,   Patient Education,   Care Plan,   Allergies     Media     Document on 3/7/2018 3:28 PM by [facility], System Message : [email or web address]   Patient Demographics     Patient Name  Nurys Do Sex  Female   1950 [address]   Address  [address] [place] [address] Phone  [phone] (Home)  [phone] (Mobile) #Preferred#  Orders Placed     None   Medication Renewals and Changes       None     Medication List  Visit Diagnoses       None     Problem List      "

## 2018-03-09 NOTE — TELEPHONE ENCOUNTER
Pt notified, verbalized understanding. Pt stated that she will continue with the current dose of mediations.

## 2018-04-16 RX ORDER — LOSARTAN POTASSIUM 50 MG/1
TABLET ORAL
Qty: 90 TABLET | Refills: 3 | Status: SHIPPED | OUTPATIENT
Start: 2018-04-16 | End: 2019-05-13 | Stop reason: SDUPTHER

## 2018-06-19 ENCOUNTER — LAB VISIT (OUTPATIENT)
Dept: LAB | Facility: HOSPITAL | Age: 68
End: 2018-06-19
Attending: INTERNAL MEDICINE
Payer: COMMERCIAL

## 2018-06-19 DIAGNOSIS — E78.00 HYPERCHOLESTEROLEMIA: ICD-10-CM

## 2018-06-19 LAB
ALT SERPL W/O P-5'-P-CCNC: 14 U/L
AST SERPL-CCNC: 13 U/L
CHOLEST SERPL-MCNC: 116 MG/DL
CHOLEST/HDLC SERPL: 2.5 {RATIO}
HDLC SERPL-MCNC: 46 MG/DL
HDLC SERPL: 39.7 %
LDLC SERPL CALC-MCNC: 55.8 MG/DL
NONHDLC SERPL-MCNC: 70 MG/DL
TRIGL SERPL-MCNC: 71 MG/DL

## 2018-06-19 PROCEDURE — 84450 TRANSFERASE (AST) (SGOT): CPT

## 2018-06-19 PROCEDURE — 80061 LIPID PANEL: CPT

## 2018-06-19 PROCEDURE — 36415 COLL VENOUS BLD VENIPUNCTURE: CPT | Mod: PO

## 2018-06-19 PROCEDURE — 84460 ALANINE AMINO (ALT) (SGPT): CPT

## 2018-06-20 ENCOUNTER — TELEPHONE (OUTPATIENT)
Dept: CARDIOLOGY | Facility: CLINIC | Age: 68
End: 2018-06-20

## 2018-06-20 NOTE — TELEPHONE ENCOUNTER
----- Message from Braxton Montalvo MD sent at 6/20/2018  4:03 PM CDT -----  Release labs much better

## 2018-10-18 ENCOUNTER — PATIENT MESSAGE (OUTPATIENT)
Dept: FAMILY MEDICINE | Facility: CLINIC | Age: 68
End: 2018-10-18

## 2018-10-18 ENCOUNTER — TELEPHONE (OUTPATIENT)
Dept: FAMILY MEDICINE | Facility: CLINIC | Age: 68
End: 2018-10-18

## 2018-10-18 NOTE — TELEPHONE ENCOUNTER
----- Message from Samra Mosqueda sent at 10/18/2018  1:31 PM CDT -----  Contact: call [phone]  Calling to make a nurse visit for a flu shot

## 2018-10-19 ENCOUNTER — CLINICAL SUPPORT (OUTPATIENT)
Dept: FAMILY MEDICINE | Facility: CLINIC | Age: 68
End: 2018-10-19
Payer: COMMERCIAL

## 2018-10-19 DIAGNOSIS — Z23 NEED FOR PROPHYLACTIC VACCINATION AND INOCULATION AGAINST INFLUENZA: Primary | ICD-10-CM

## 2018-10-19 PROCEDURE — 90662 IIV NO PRSV INCREASED AG IM: CPT | Mod: S$GLB,,, | Performed by: FAMILY MEDICINE

## 2018-10-19 PROCEDURE — 90471 IMMUNIZATION ADMIN: CPT | Mod: S$GLB,,, | Performed by: FAMILY MEDICINE

## 2018-11-09 DIAGNOSIS — Z12.39 BREAST CANCER SCREENING: ICD-10-CM

## 2018-11-12 ENCOUNTER — PATIENT MESSAGE (OUTPATIENT)
Dept: CARDIOLOGY | Facility: CLINIC | Age: 68
End: 2018-11-12

## 2018-11-12 ENCOUNTER — TELEPHONE (OUTPATIENT)
Dept: FAMILY MEDICINE | Facility: CLINIC | Age: 68
End: 2018-11-12

## 2018-11-12 ENCOUNTER — PATIENT MESSAGE (OUTPATIENT)
Dept: FAMILY MEDICINE | Facility: CLINIC | Age: 68
End: 2018-11-12

## 2018-11-12 DIAGNOSIS — I11.9 HYPERTENSIVE LEFT VENTRICULAR HYPERTROPHY, WITHOUT HEART FAILURE: Primary | ICD-10-CM

## 2018-11-12 DIAGNOSIS — E78.00 HYPERCHOLESTEREMIA: ICD-10-CM

## 2019-02-04 ENCOUNTER — LAB VISIT (OUTPATIENT)
Dept: LAB | Facility: HOSPITAL | Age: 69
End: 2019-02-04
Attending: INTERNAL MEDICINE
Payer: COMMERCIAL

## 2019-02-04 DIAGNOSIS — E78.00 HYPERCHOLESTEROLEMIA: ICD-10-CM

## 2019-02-04 DIAGNOSIS — I10 ESSENTIAL HYPERTENSION: ICD-10-CM

## 2019-02-04 LAB
ALT SERPL W/O P-5'-P-CCNC: 15 U/L
ANION GAP SERPL CALC-SCNC: 9 MMOL/L
AST SERPL-CCNC: 14 U/L
BUN SERPL-MCNC: 16 MG/DL
CALCIUM SERPL-MCNC: 9.5 MG/DL
CHLORIDE SERPL-SCNC: 107 MMOL/L
CHOLEST SERPL-MCNC: 135 MG/DL
CHOLEST/HDLC SERPL: 2.6 {RATIO}
CO2 SERPL-SCNC: 24 MMOL/L
CREAT SERPL-MCNC: 0.7 MG/DL
EST. GFR  (AFRICAN AMERICAN): >60 ML/MIN/1.73 M^2
EST. GFR  (NON AFRICAN AMERICAN): >60 ML/MIN/1.73 M^2
GLUCOSE SERPL-MCNC: 98 MG/DL
HDLC SERPL-MCNC: 52 MG/DL
HDLC SERPL: 38.5 %
LDLC SERPL CALC-MCNC: 70.6 MG/DL
NONHDLC SERPL-MCNC: 83 MG/DL
POTASSIUM SERPL-SCNC: 3.9 MMOL/L
SODIUM SERPL-SCNC: 140 MMOL/L
TRIGL SERPL-MCNC: 62 MG/DL

## 2019-02-04 PROCEDURE — 80061 LIPID PANEL: CPT

## 2019-02-04 PROCEDURE — 84460 ALANINE AMINO (ALT) (SGPT): CPT

## 2019-02-04 PROCEDURE — 84450 TRANSFERASE (AST) (SGOT): CPT

## 2019-02-04 PROCEDURE — 80048 BASIC METABOLIC PNL TOTAL CA: CPT

## 2019-02-04 PROCEDURE — 36415 COLL VENOUS BLD VENIPUNCTURE: CPT | Mod: PO

## 2019-02-06 ENCOUNTER — OFFICE VISIT (OUTPATIENT)
Dept: FAMILY MEDICINE | Facility: CLINIC | Age: 69
End: 2019-02-06
Payer: COMMERCIAL

## 2019-02-06 ENCOUNTER — LAB VISIT (OUTPATIENT)
Dept: LAB | Facility: HOSPITAL | Age: 69
End: 2019-02-06
Attending: FAMILY MEDICINE
Payer: COMMERCIAL

## 2019-02-06 VITALS
BODY MASS INDEX: 35.92 KG/M2 | WEIGHT: 190.25 LBS | DIASTOLIC BLOOD PRESSURE: 70 MMHG | TEMPERATURE: 98 F | HEIGHT: 61 IN | HEART RATE: 73 BPM | SYSTOLIC BLOOD PRESSURE: 120 MMHG

## 2019-02-06 DIAGNOSIS — Z85.850 HX OF THYROID CANCER: ICD-10-CM

## 2019-02-06 DIAGNOSIS — I11.9 HYPERTENSIVE LEFT VENTRICULAR HYPERTROPHY, WITHOUT HEART FAILURE: ICD-10-CM

## 2019-02-06 DIAGNOSIS — E78.00 HYPERCHOLESTEREMIA: ICD-10-CM

## 2019-02-06 DIAGNOSIS — M85.80 OSTEOPENIA, UNSPECIFIED LOCATION: ICD-10-CM

## 2019-02-06 DIAGNOSIS — N95.1 MENOPAUSAL STATE: ICD-10-CM

## 2019-02-06 DIAGNOSIS — Z28.39 IMMUNIZATION DEFICIENCY: ICD-10-CM

## 2019-02-06 DIAGNOSIS — Z00.00 ROUTINE GENERAL MEDICAL EXAMINATION AT A HEALTH CARE FACILITY: Primary | ICD-10-CM

## 2019-02-06 DIAGNOSIS — Z12.31 SCREENING MAMMOGRAM, ENCOUNTER FOR: ICD-10-CM

## 2019-02-06 LAB
BASOPHILS # BLD AUTO: 0.05 K/UL
BASOPHILS NFR BLD: 0.8 %
DIFFERENTIAL METHOD: ABNORMAL
EOSINOPHIL # BLD AUTO: 0.2 K/UL
EOSINOPHIL NFR BLD: 3 %
ERYTHROCYTE [DISTWIDTH] IN BLOOD BY AUTOMATED COUNT: 14 %
ESTIMATED AVG GLUCOSE: 103 MG/DL
HBA1C MFR BLD HPLC: 5.2 %
HCT VFR BLD AUTO: 35.6 %
HGB BLD-MCNC: 11.5 G/DL
IMM GRANULOCYTES # BLD AUTO: 0.01 K/UL
IMM GRANULOCYTES NFR BLD AUTO: 0.2 %
LYMPHOCYTES # BLD AUTO: 1.9 K/UL
LYMPHOCYTES NFR BLD: 32.4 %
MCH RBC QN AUTO: 31.2 PG
MCHC RBC AUTO-ENTMCNC: 32.3 G/DL
MCV RBC AUTO: 97 FL
MONOCYTES # BLD AUTO: 0.5 K/UL
MONOCYTES NFR BLD: 8.7 %
NEUTROPHILS # BLD AUTO: 3.3 K/UL
NEUTROPHILS NFR BLD: 54.9 %
NRBC BLD-RTO: 0 /100 WBC
PLATELET # BLD AUTO: 277 K/UL
PMV BLD AUTO: 11.1 FL
RBC # BLD AUTO: 3.69 M/UL
T4 FREE SERPL-MCNC: 1.36 NG/DL
TSH SERPL DL<=0.005 MIU/L-ACNC: 0.08 UIU/ML
WBC # BLD AUTO: 5.96 K/UL

## 2019-02-06 PROCEDURE — 85025 COMPLETE CBC W/AUTO DIFF WBC: CPT

## 2019-02-06 PROCEDURE — 99397 PR PREVENTIVE VISIT,EST,65 & OVER: ICD-10-PCS | Mod: 25,S$GLB,, | Performed by: FAMILY MEDICINE

## 2019-02-06 PROCEDURE — 99999 PR PBB SHADOW E&M-EST. PATIENT-LVL IV: CPT | Mod: PBBFAC,,, | Performed by: FAMILY MEDICINE

## 2019-02-06 PROCEDURE — 3074F PR MOST RECENT SYSTOLIC BLOOD PRESSURE < 130 MM HG: ICD-10-PCS | Mod: CPTII,S$GLB,, | Performed by: FAMILY MEDICINE

## 2019-02-06 PROCEDURE — 3074F SYST BP LT 130 MM HG: CPT | Mod: CPTII,S$GLB,, | Performed by: FAMILY MEDICINE

## 2019-02-06 PROCEDURE — 3078F DIAST BP <80 MM HG: CPT | Mod: CPTII,S$GLB,, | Performed by: FAMILY MEDICINE

## 2019-02-06 PROCEDURE — 90471 IMMUNIZATION ADMIN: CPT | Mod: S$GLB,,, | Performed by: FAMILY MEDICINE

## 2019-02-06 PROCEDURE — 90715 TDAP VACCINE 7 YRS/> IM: CPT | Mod: S$GLB,,, | Performed by: FAMILY MEDICINE

## 2019-02-06 PROCEDURE — 90472 IMMUNIZATION ADMIN EACH ADD: CPT | Mod: S$GLB,,, | Performed by: FAMILY MEDICINE

## 2019-02-06 PROCEDURE — 90472 TDAP VACCINE GREATER THAN OR EQUAL TO 7YO IM: ICD-10-PCS | Mod: S$GLB,,, | Performed by: FAMILY MEDICINE

## 2019-02-06 PROCEDURE — 84443 ASSAY THYROID STIM HORMONE: CPT

## 2019-02-06 PROCEDURE — 84439 ASSAY OF FREE THYROXINE: CPT

## 2019-02-06 PROCEDURE — 90471 PNEUMOCOCCAL POLYSACCHARIDE VACCINE 23-VALENT =>2YO SQ IM: ICD-10-PCS | Mod: S$GLB,,, | Performed by: FAMILY MEDICINE

## 2019-02-06 PROCEDURE — 90715 TDAP VACCINE GREATER THAN OR EQUAL TO 7YO IM: ICD-10-PCS | Mod: S$GLB,,, | Performed by: FAMILY MEDICINE

## 2019-02-06 PROCEDURE — 99999 PR PBB SHADOW E&M-EST. PATIENT-LVL IV: ICD-10-PCS | Mod: PBBFAC,,, | Performed by: FAMILY MEDICINE

## 2019-02-06 PROCEDURE — 90732 PNEUMOCOCCAL POLYSACCHARIDE VACCINE 23-VALENT =>2YO SQ IM: ICD-10-PCS | Mod: S$GLB,,, | Performed by: FAMILY MEDICINE

## 2019-02-06 PROCEDURE — 90732 PPSV23 VACC 2 YRS+ SUBQ/IM: CPT | Mod: S$GLB,,, | Performed by: FAMILY MEDICINE

## 2019-02-06 PROCEDURE — 36415 COLL VENOUS BLD VENIPUNCTURE: CPT | Mod: PO

## 2019-02-06 PROCEDURE — 83036 HEMOGLOBIN GLYCOSYLATED A1C: CPT

## 2019-02-06 PROCEDURE — 3078F PR MOST RECENT DIASTOLIC BLOOD PRESSURE < 80 MM HG: ICD-10-PCS | Mod: CPTII,S$GLB,, | Performed by: FAMILY MEDICINE

## 2019-02-06 PROCEDURE — 99397 PER PM REEVAL EST PAT 65+ YR: CPT | Mod: 25,S$GLB,, | Performed by: FAMILY MEDICINE

## 2019-02-06 RX ORDER — EPINEPHRINE 0.22MG
100 AEROSOL WITH ADAPTER (ML) INHALATION DAILY
COMMUNITY
End: 2020-10-22

## 2019-02-06 NOTE — PATIENT INSTRUCTIONS
Due for  Vaccines  - tetanus & pneumonia    Due Mammo & DEXA    Check labs for CBC , Hga1c & TSH since it wasn't linked with her lab order - she has to leave the office early & will call to set up these lab tests.     ==============================================================  I'd like to advise you on current CANCER SCREENING recommendations:  ~~~~~~~~~~~~~~~~~~~~~~~~~~~~~~~~~~~~~~~~~~~~~~~~~~~~~~~~~~~~~  PAP SMEAR to evaluate for cervical cancer screening  Every 3 years (or 30 - 64 yo, every 5 years with HPV co-testing).   MAMMOGRAM  every 1-2 years, from 50 - 74 years old. We can discuss your risk at 40 & determine whether to get mammogram sooner  Of course, I can perform this sooner if there is family history of cancer, or if you have problems or questions  ==========    With OSTEOPENIA - In between normal bone strength and weakened bone strength with osteoporosis, you are at slightly increased risk for hip fracture, spinal compression fracture in the future    WEIGHT BEARING EXERCISE (carrying light weights) is the BEST way to strengthen the bone where your muscles insert & prevent future fractures.     Focus on 1200 mg of CALCIUM daily  - Which is the equivalent of 3 glasses of milk daily. If you cannot tolerate this, you can substitute yogurt or cheese for one of these servings.  Eat foods rich in calcium.Also you can take TUMS supplements or Viactiv chocolate chews calcium supplement.     Also, 800 units of VITAMIN D daily - This is the equivalent of 10 minutes of direct sunlight daily. If you are not in the sun, consider Foods rich in vitamin D and over the counter  supplement .     Let's repeat this test in 2 years. This is a test that is easily done on the same day as your mammogram    ==============================  RECOMMENDATIONS FOR FEMALES  ==============================  Your #1 MEDICINE is DAILY EXERCISE - 15-20 minutes of huffing & puffing EVERY DAY.     Prevent the #1 cause of death-  cardiovascular disease (HEART ATTACK & STROKE) by checking for normal blood pressure, cholesterol, sugars, & by not smoking.     VACCINES: Yearly FLU shot, PNEUMONIA shot after 65,  SHINGLES shot after 50    Screening colonoscopy at AGE  50 & every 10 years to check for COLON CANCER,  one of the most common & preventable cancers (Or FIT kit yearly) Repeat in 3 years if POLYP found     I recommend  high fiber (5 fresh fruits or vegetables daily), low fat diet and aerobic  exercise (huffing/ puffing/ sweating for 20 min straight at least 4 days a week)    Follow up yearly with mammogram, fasting lipids, CMP, CBC prior.   ==============================================================

## 2019-02-06 NOTE — PROGRESS NOTES
Subjective:      Patient ID: Nurys Do is a 68 y.o. female.    Chief Complaint: Annual Exam    Here today for general exam.     She has a hx of thyroid cancer & follows with ENT Dr Quinn. I can check TSH today.     She is mildly anemic, but states she had normal colonoscopy  (no polpys) 2012 with Dr. Guerrier at [facility]    Last diagnostic mammogram in 2016 shows R breast cyst. She hasn't scheduled a repeat mammogram since 2016    She follows with cardiologist    Denies any chest pain, shortness of breath, nausea vomiting constipation diarrhea, blood in stool, heartburn    The 10-year ASCVD risk score (Adeshannon LEMUS Jr., et al., 2013) is: 10%    Values used to calculate the score:      Age: 68 years      Sex: Female      Is Non- : No      Diabetic: No      Tobacco smoker: No      Systolic Blood Pressure: 120 mmHg      Is BP treated: Yes      HDL Cholesterol: 52 mg/dL      Total Cholesterol: 135 mg/dL      No results found for: HGBA1C  No results found for: MICALBCREAT  Lab Results   Component Value Date    LDLCALC 70.6 02/04/2019    LDLCALC 55.8 (L) 06/19/2018    CHOL 135 02/04/2019    HDL 52 02/04/2019    TRIG 62 02/04/2019       Lab Results   Component Value Date     02/04/2019    K 3.9 02/04/2019     02/04/2019    CO2 24 02/04/2019    GLU 98 02/04/2019    BUN 16 02/04/2019    CREATININE 0.7 02/04/2019    CALCIUM 9.5 02/04/2019    PROT 7.1 12/04/2017    ALBUMIN 3.4 (L) 12/04/2017    BILITOT 0.3 12/04/2017    ALKPHOS 61 12/04/2017    AST 14 02/04/2019    ALT 15 02/04/2019    ANIONGAP 9 02/04/2019    ESTGFRAFRICA >60.0 02/04/2019    EGFRNONAA >60.0 02/04/2019    WBC 6.01 12/04/2017    HGB 11.6 (L) 12/04/2017    HGB 11.8 (L) 10/30/2015    HCT 34.9 (L) 12/04/2017    MCV 95 12/04/2017     12/04/2017    TSH 0.440 12/04/2017    HEPCAB Negative 08/23/2016    [id number] 33 11/18/2013         Current Outpatient Medications on File Prior to Visit   Medication Sig     "atorvastatin (LIPITOR) 20 MG tablet Take 1 tablet (20 mg total) by mouth once daily.    coenzyme Q10 (CO Q-10) 100 mg capsule Take 100 mg by mouth once daily.    fluticasone (FLONASE) 50 mcg/actuation nasal spray instill 2 sprays into each nostril once daily (Patient taking differently: instill 2 sprays into each nostril as needed)    levothyroxine (SYNTHROID) 200 MCG tablet Take 1 tablet (200 mcg total) by mouth before breakfast.    losartan (COZAAR) 50 MG tablet TAKE 1 TABLET DAILY (Patient taking differently: TAKE 1/2 TABLET DAILY)    aspirin 81 MG Chew Take 1 tablet (81 mg total) by mouth once daily.     No current facility-administered medications on file prior to visit.      Past Medical History:   Diagnosis Date    Diverticulosis     colonoscopy 2011, Dr Guerrier    [place] cardiac risk 10-20% in next 10 years 2/1/2018    HTN (hypertension), benign 2/1/2018    Hypertensive left ventricular hypertrophy, without heart failure 02/01/2018    Dr Ellsworth    Knee pain, left     eval in PT with pes planus, L foot, hip & knee pain, Dr Tirado takes Meloxicam 15 mg daily    Osteopenia 1/6/2016    Thyroid cancer     Dr Quinn     Past Surgical History:   Procedure Laterality Date    right foot surgery  2017    Dr Garcia    THYROIDECTOMY      x2, Dr Quinn     Social History     Social History Narrative    , 5 children, nonsmoker, ETOH socially, GYN here nml 2014, colonoscopy with diverticulosis 2011 Dr Guerrier     Family History   Problem Relation Age of Onset    Hypertension Mother     Dementia Mother     Colon cancer Father     Heart disease Father      Vitals:    02/06/19 0811   BP: 120/70   Pulse: 73   Temp: 97.9 °F (36.6 °C)   TempSrc: Oral   Weight: 86.3 kg (190 lb 4.1 oz)   Height: 5' 1" (1.549 m)   PainSc: 0-No pain     Objective:   Physical Exam   Constitutional: She is oriented to person, place, and time. She appears well-developed and well-nourished.   HENT:   Head: Normocephalic " and atraumatic.   Right Ear: External ear normal.   Left Ear: External ear normal.   Nose: Nose normal.   Mouth/Throat: Oropharynx is clear and moist.   Eyes: EOM are normal. Pupils are equal, round, and reactive to light.   Neck: Neck supple. No thyromegaly present.   Cardiovascular: Normal rate, regular rhythm, normal heart sounds and intact distal pulses.   No murmur heard.  Pulmonary/Chest: Effort normal and breath sounds normal. She has no wheezes.   Abdominal: Soft. Bowel sounds are normal. She exhibits no distension and no mass. There is no tenderness. There is no rebound and no guarding.   Musculoskeletal: She exhibits no edema.   Lymphadenopathy:     She has no cervical adenopathy.   Neurological: She is alert and oriented to person, place, and time.   Skin: Skin is warm and dry.   Psychiatric: She has a normal mood and affect. Her behavior is normal.     Assessment:     1. Routine general medical examination at a health care facility    2. Immunization deficiency    3. Screening mammogram, encounter for    4. Menopausal state    5. Hx of thyroid cancer    6. Osteopenia, unspecified location    7. Hypertensive left ventricular hypertrophy, without heart failure    8. Hypercholesteremia      Plan:     Orders Placed This Encounter    Mammo Digital Diagnostic Bilat    DXA Bone Density Spine And Hip    (In Office Administered) Pneumococcal Polysaccharide Vaccine (23 Valent) (SQ/IM)    (In Office Administered) Tdap Vaccine       Patient Instructions   Due for  Vaccines  - tetanus & pneumonia    Due Mammo & DEXA    Check labs for CBC , Hga1c & TSH since it wasn't linked with her lab order - she has to leave the office early & will call to set up these lab tests.     ==============================================================  I'd like to advise you on current CANCER SCREENING recommendations:  ~~~~~~~~~~~~~~~~~~~~~~~~~~~~~~~~~~~~~~~~~~~~~~~~~~~~~~~~~~~~~  PAP SMEAR to evaluate for cervical cancer  screening  Every 3 years (or 30 - 66 yo, every 5 years with HPV co-testing).   MAMMOGRAM  every 1-2 years, from 50 - 74 years old. We can discuss your risk at 40 & determine whether to get mammogram sooner  Of course, I can perform this sooner if there is family history of cancer, or if you have problems or questions  ==========    With OSTEOPENIA - In between normal bone strength and weakened bone strength with osteoporosis, you are at slightly increased risk for hip fracture, spinal compression fracture in the future    WEIGHT BEARING EXERCISE (carrying light weights) is the BEST way to strengthen the bone where your muscles insert & prevent future fractures.     Focus on 1200 mg of CALCIUM daily  - Which is the equivalent of 3 glasses of milk daily. If you cannot tolerate this, you can substitute yogurt or cheese for one of these servings.  Eat foods rich in calcium.Also you can take TUMS supplements or Viactiv chocolate chews calcium supplement.     Also, 800 units of VITAMIN D daily - This is the equivalent of 10 minutes of direct sunlight daily. If you are not in the sun, consider Foods rich in vitamin D and over the counter  supplement .     Let's repeat this test in 2 years. This is a test that is easily done on the same day as your mammogram    ==============================  RECOMMENDATIONS FOR FEMALES  ==============================  Your #1 MEDICINE is DAILY EXERCISE - 15-20 minutes of huffing & puffing EVERY DAY.     Prevent the #1 cause of death- cardiovascular disease (HEART ATTACK & STROKE) by checking for normal blood pressure, cholesterol, sugars, & by not smoking.     VACCINES: Yearly FLU shot, PNEUMONIA shot after 65,  SHINGLES shot after 50    Screening colonoscopy at AGE  50 & every 10 years to check for COLON CANCER,  one of the most common & preventable cancers (Or FIT kit yearly) Repeat in 3 years if POLYP found     I recommend  high fiber (5 fresh fruits or vegetables daily), low fat diet  and aerobic  exercise (huffing/ puffing/ sweating for 20 min straight at least 4 days a week)    Follow up yearly with mammogram, fasting lipids, CMP, CBC prior.   ==============================================================                                  Answers for HPI/ROS submitted by the patient on 2/1/2019   activity change: No  unexpected weight change: No  neck pain: No  hearing loss: No  rhinorrhea: No  trouble swallowing: No  eye discharge: No  visual disturbance: No  chest tightness: No  wheezing: No  chest pain: No  palpitations: No  blood in stool: No  constipation: No  vomiting: No  diarrhea: No  polydipsia: No  polyuria: No  difficulty urinating: No  hematuria: No  menstrual problem: No  dysuria: No  joint swelling: No  arthralgias: Yes  headaches: No  weakness: No  confusion: No  dysphoric mood: No

## 2019-02-06 NOTE — PROGRESS NOTES
Two patient identifiers used, allergies reviewed, vaccines confirmed.  Tdap vaccine administered IM right deltoid.  Pneumovax/23 pneumococcal polysaccharide vaccine administered Im left deltoid.  Pt tolerated both injections well, no redness or bruising at either injection site.  Pt advised to remain in clinic 15 mins following injections for observation.

## 2019-02-07 ENCOUNTER — PATIENT MESSAGE (OUTPATIENT)
Dept: FAMILY MEDICINE | Facility: CLINIC | Age: 69
End: 2019-02-07

## 2019-02-11 ENCOUNTER — TELEPHONE (OUTPATIENT)
Dept: FAMILY MEDICINE | Facility: CLINIC | Age: 69
End: 2019-02-11

## 2019-02-11 DIAGNOSIS — D50.8 IRON DEFICIENCY ANEMIA SECONDARY TO INADEQUATE DIETARY IRON INTAKE: Primary | ICD-10-CM

## 2019-02-11 NOTE — PROGRESS NOTES
Hello.     Your Thyroid levels are enclosed.Free T4 is normal, TSH is a little low, but your ENT Dr Quinn may want it here with your history of thyroid cancer. Please bring a copy of these labs to your appt with him to discuss your medication.     Your sugar test is fine - no diabetes.     You are a little more anemic & we do need to test for microscopic blood in the stool, since this is the most common cause.     Paula will call you about the home stool test (FIT)  to return to us.

## 2019-02-11 NOTE — TELEPHONE ENCOUNTER
Pt has read email msg and verbalizes understanding.  Nurse appt scheduled for tomorrow to dispense   FitKit.

## 2019-02-11 NOTE — TELEPHONE ENCOUNTER
----- Message from Meeta Maciel MD sent at 2/11/2019 12:39 PM CST -----  Hello.     Your Thyroid levels are enclosed.Free T4 is normal, TSH is a little low, but your ENT Dr Quinn may want it here with your history of thyroid cancer. Please bring a copy of these labs to your appt with him to discuss your medication.     Your sugar test is fine - no diabetes.     You are a little more anemic & we do need to test for microscopic blood in the stool, since this is the most common cause.     Paula will call you about the home stool test (FIT)  to return to us.

## 2019-02-12 ENCOUNTER — CLINICAL SUPPORT (OUTPATIENT)
Dept: FAMILY MEDICINE | Facility: CLINIC | Age: 69
End: 2019-02-12
Payer: COMMERCIAL

## 2019-02-12 DIAGNOSIS — Z12.11 SCREENING FOR COLON CANCER: Primary | ICD-10-CM

## 2019-02-13 ENCOUNTER — LAB VISIT (OUTPATIENT)
Dept: LAB | Facility: HOSPITAL | Age: 69
End: 2019-02-13
Attending: FAMILY MEDICINE
Payer: COMMERCIAL

## 2019-02-13 DIAGNOSIS — D50.8 IRON DEFICIENCY ANEMIA SECONDARY TO INADEQUATE DIETARY IRON INTAKE: ICD-10-CM

## 2019-02-13 PROCEDURE — 82274 ASSAY TEST FOR BLOOD FECAL: CPT

## 2019-02-14 LAB — HEMOCCULT STL QL IA: NEGATIVE

## 2019-02-19 ENCOUNTER — OFFICE VISIT (OUTPATIENT)
Dept: CARDIOLOGY | Facility: CLINIC | Age: 69
End: 2019-02-19
Payer: COMMERCIAL

## 2019-02-19 VITALS
SYSTOLIC BLOOD PRESSURE: 140 MMHG | DIASTOLIC BLOOD PRESSURE: 86 MMHG | HEIGHT: 62 IN | WEIGHT: 186.63 LBS | BODY MASS INDEX: 34.34 KG/M2 | HEART RATE: 68 BPM

## 2019-02-19 DIAGNOSIS — I10 ESSENTIAL HYPERTENSION: ICD-10-CM

## 2019-02-19 DIAGNOSIS — I25.10 CORONARY ARTERY DISEASE DUE TO CALCIFIED CORONARY LESION: Primary | ICD-10-CM

## 2019-02-19 DIAGNOSIS — I25.84 CORONARY ARTERY DISEASE DUE TO CALCIFIED CORONARY LESION: Primary | ICD-10-CM

## 2019-02-19 DIAGNOSIS — E78.00 PURE HYPERCHOLESTEROLEMIA: ICD-10-CM

## 2019-02-19 PROCEDURE — 99999 PR PBB SHADOW E&M-EST. PATIENT-LVL III: ICD-10-PCS | Mod: PBBFAC,,, | Performed by: INTERNAL MEDICINE

## 2019-02-19 PROCEDURE — 1101F PT FALLS ASSESS-DOCD LE1/YR: CPT | Mod: CPTII,S$GLB,, | Performed by: INTERNAL MEDICINE

## 2019-02-19 PROCEDURE — 99214 OFFICE O/P EST MOD 30 MIN: CPT | Mod: S$GLB,,, | Performed by: INTERNAL MEDICINE

## 2019-02-19 PROCEDURE — 99999 PR PBB SHADOW E&M-EST. PATIENT-LVL III: CPT | Mod: PBBFAC,,, | Performed by: INTERNAL MEDICINE

## 2019-02-19 PROCEDURE — 3077F SYST BP >= 140 MM HG: CPT | Mod: CPTII,S$GLB,, | Performed by: INTERNAL MEDICINE

## 2019-02-19 PROCEDURE — 3079F DIAST BP 80-89 MM HG: CPT | Mod: CPTII,S$GLB,, | Performed by: INTERNAL MEDICINE

## 2019-02-19 PROCEDURE — 3079F PR MOST RECENT DIASTOLIC BLOOD PRESSURE 80-89 MM HG: ICD-10-PCS | Mod: CPTII,S$GLB,, | Performed by: INTERNAL MEDICINE

## 2019-02-19 PROCEDURE — 3077F PR MOST RECENT SYSTOLIC BLOOD PRESSURE >= 140 MM HG: ICD-10-PCS | Mod: CPTII,S$GLB,, | Performed by: INTERNAL MEDICINE

## 2019-02-19 PROCEDURE — 1101F PR PT FALLS ASSESS DOC 0-1 FALLS W/OUT INJ PAST YR: ICD-10-PCS | Mod: CPTII,S$GLB,, | Performed by: INTERNAL MEDICINE

## 2019-02-19 PROCEDURE — 99214 PR OFFICE/OUTPT VISIT, EST, LEVL IV, 30-39 MIN: ICD-10-PCS | Mod: S$GLB,,, | Performed by: INTERNAL MEDICINE

## 2019-02-19 NOTE — PATIENT INSTRUCTIONS
Regular exercise - swim in the warmer months and use a stationary bicycle or Nu-Step during winter.  Continue aspirin, Lipitor (atorvastatin) and losartan.  Check BP at home at different times to see if it tends to be higher during certain times of the day. Let us know in a couple of weeks.  Goal SBP <130 mm Hg (80% of the time).    ===============    LDL - bad type - improves with diet and medications: typically statins; most other medications that lower LDL have not been proven to prevent heart attacks.  May not improve significantly with exercise alone.  Ideally less than 100 mg/dl.   But the lower the better. Statins (cholesterol lowering meds) lower LDL. If you have coronary artery disease or blockages anywhere else in the body, it should be well below 70 mg/dl.    HDL - good type - improves with exercise.  Ideally greater than 50 mg/dl. The proportion of HDL to the total cholesterol is more important than the absolute HDL.  This means a HDL of 45 out of a total cholesterol of 130 mg'dl is pretty good, but the same HDL out of a total of  200 mg/dl is not quite as good. A level of 30% or higher is ideal.    TGs (triglycerides) - also bad - can change very quickly and considerably with certain foods. Improve with diet, exercise and high dose fish oil.  In some cases a low carbohydrate diet will lower TGs better than a low fat diet.  Ideal range  mg/dl.    Sugar, fat and cholesterol in food:     A sensible diet that limits the intake of sugars, saturated (bad) fats and trans fats while increasing the intake of unsaturated (good) fats and plant proteins is the basis of the current dietary recommendations.      We now recommend drastically reducing the intake of sugar. There is less emphasis on excluding fat.     Cholesterol in our food is no longer a significant concern, mainly because it is generally present in relatively small amounts. However please do not confuse this with the role of cholesterol in our  blood and arteries. The liver converts certain foods into cholesterol.  It is this cholesterol and other fats that clogs up our arteries.       Most foods that are high in cholesterol are also high in saturated fat. But there is way more saturated fat than cholesterol in these foods. So its the saturated fat content that matters more than cholesterol. On the other hand, there are a handful of foods that are high in cholesterol but do not contain much saturated fat: eggs, shrimp, crab legs and crawfish are OK to eat.       Saturated fat is the bad fat - you should limit your intake of this. Deep fried foods, meats and other animal fats are high in saturated fat. Cookies, donuts and most dessert and cakes are usually high in both saturated fat and sugar.       Unsaturated fat is the good fat. It contains the same number of calories as saturated fat but these fats do not get deposited in our arteries. The Mediterranean style diet encourages the intake of unsaturated fat - olive oil, avocado and unsalted nuts. So instead of baking a piece of fish, it may be better to pan-chaves it using olive oil.     You should eat a few servings of vegetables (and fruit as long as you are not diabetic) everyday. Substitute some plant proteins in place of meat: beans, lentils, quinoa and oatmeal. They are lean proteins.     Do not use stick butter or stick margarine. Butter that comes in a tub is soft butter. It consists of 1/2 butter and 1/2 vegetable oil., either canola or olive oil It is fine to use that.       Trans fats should definitely be avoided. Most foods that are labelled as containing 0 gms of trans fat may still contain several hundred milligrams of trans fat: creamer, margarine, dough, deep fried foods, ready made frosting, potato, corn and torilla chips, cakes, cookies, pie crusts and crackers containing shortening made with hydrogenated vegetable oil.

## 2019-02-19 NOTE — PROGRESS NOTES
Subjective:   Patient ID:  Nurys Do is a 68 y.o. female who presents for follow-up of Hypertension      Problem List:  Hypertension  Hypercholesterolemia  CACS ~200 in 2/18  Overweight BMI >35  S/P thyroidectomy (thyroid cancer)    HPI:   Nurys Do feels generally well.  She does not report chest discomfort or shortness of breath.  A coronary artery calcium score in 2/18 was 200. Dobutamine stress echo in 3/18 was negative for ischemia.  Wall thickness was normal.  There was no LV hypertrophy. She takes losartan 25 mg a day at night. SBP has been 125 mm Hg at home, but is a bit higher today. She has been checking her BP at night.  She agreed to take a statin last year.  LDL has decreased from 133 to 56-71 mg/dl. Hepatic transaminases are within normal limits.   She takes low dose aspirin.  She swam during the summer and lost almost 20 lbs, but stopped exercising due to the cold and arthritis; gained some of it back.       Review of Systems   Constitution: Positive for weight loss. Negative for weakness, malaise/fatigue and weight gain.   HENT: Negative for hearing loss and nosebleeds.    Eyes: Negative for visual disturbance.   Cardiovascular: Positive for dyspnea on exertion. Negative for chest pain, claudication, irregular heartbeat, leg swelling, near-syncope, palpitations and syncope.   Respiratory: Negative for cough, hemoptysis, sputum production and wheezing.    Hematologic/Lymphatic: Does not bruise/bleed easily.   Musculoskeletal: Positive for arthritis, falls, muscle cramps and muscle weakness. Negative for back pain, joint pain, joint swelling and neck pain.   Gastrointestinal: Negative for abdominal pain, constipation, diarrhea, heartburn and melena.   Genitourinary: Positive for frequency and nocturia. Negative for hematuria.   Neurological: Negative for dizziness, focal weakness, headaches, light-headedness, loss of balance, numbness, paresthesias and seizures.  "  Psychiatric/Behavioral: Negative for depression. The patient is not nervous/anxious.        Current Outpatient Medications   Medication Sig    aspirin 81 MG Chew Take 1 tablet (81 mg total) by mouth once daily.    atorvastatin (LIPITOR) 20 MG tablet Take 1 tablet (20 mg total) by mouth once daily.    coenzyme Q10 (CO Q-10) 100 mg capsule Take 100 mg by mouth once daily.    fluticasone (FLONASE) 50 mcg/actuation nasal spray instill 2 sprays into each nostril once daily (Patient taking differently: instill 2 sprays into each nostril as needed)    levothyroxine (SYNTHROID) 200 MCG tablet Take 1 tablet (200 mcg total) by mouth before breakfast.    losartan (COZAAR) 50 MG tablet TAKE 1 TABLET DAILY (Patient taking differently: TAKE 1/2 TABLET DAILY)         Social History     Tobacco Use    Smoking status: Never Smoker    Smokeless tobacco: Never Used   Substance Use Topics    Alcohol use: Yes    Drug use: Not on file         Objective:     Physical Exam   Constitutional: She is oriented to person, place, and time. She appears well-developed and well-nourished.   BP (!) 140/86   Pulse 68   Ht 5' 2" (1.575 m)   Wt 84.6 kg (186 lb 9.9 oz)   BMI 34.13 kg/m²      HENT:   Head: Normocephalic.   Neck: No JVD present. Carotid bruit is not present.   Cardiovascular: Normal rate, regular rhythm, S1 normal and S2 normal. Exam reveals no gallop.   No murmur heard.  Pulses:       Radial pulses are 2+ on the right side, and 2+ on the left side.        Posterior tibial pulses are 2+ on the right side, and 2+ on the left side.   Pulmonary/Chest: Effort normal. She has no wheezes. She has no rales. Chest wall is not dull to percussion.   Abdominal: Soft. She exhibits no abdominal bruit. There is no splenomegaly or hepatomegaly. There is no tenderness.   Musculoskeletal:        Right lower leg: She exhibits no edema.        Left lower leg: She exhibits no edema.   Neurological: She is alert and oriented to person, place, " and time. Gait normal.   Skin: Skin is warm and dry. No bruising and no rash noted. No cyanosis. Nails show no clubbing.   Psychiatric: She has a normal mood and affect. Her speech is normal and behavior is normal. Judgment normal. Cognition and memory are normal.           Lab Results   Component Value Date    CHOL 135 02/04/2019    HDL 52 02/04/2019    LDLCALC 70.6 02/04/2019    TRIG 62 02/04/2019    CHOLHDL 38.5 02/04/2019     Lab Results   Component Value Date    GLU 98 02/04/2019    CREATININE 0.7 02/04/2019    BUN 16 02/04/2019     02/04/2019    K 3.9 02/04/2019     02/04/2019    CO2 24 02/04/2019     Lab Results   Component Value Date    ALT 15 02/04/2019    AST 14 02/04/2019    ALKPHOS 61 12/04/2017    BILITOT 0.3 12/04/2017           Assessment and Plan:     1. Coronary artery disease due to calcified coronary lesion    2. Essential hypertension    3. Pure hypercholesterolemia      Check blood pressure at home.  Low-salt diet.  Continue same meds.  Will increase losartan to 50 mg a day if SBP >130 mm Hg.  Cholesterol Education.  Exercise counseling.  Greater than 50% of 25 minutes spent counseling.      Follow-up in about 18 months (around 8/19/2020).

## 2019-02-20 ENCOUNTER — HOSPITAL ENCOUNTER (OUTPATIENT)
Dept: RADIOLOGY | Facility: HOSPITAL | Age: 69
Discharge: HOME | End: 2019-02-20
Attending: FAMILY MEDICINE
Payer: COMMERCIAL

## 2019-02-20 ENCOUNTER — HOSPITAL ENCOUNTER (OUTPATIENT)
Dept: RADIOLOGY | Facility: CLINIC | Age: 69
Discharge: HOME | End: 2019-02-20
Attending: FAMILY MEDICINE
Payer: COMMERCIAL

## 2019-02-20 DIAGNOSIS — N95.1 MENOPAUSAL STATE: ICD-10-CM

## 2019-02-20 DIAGNOSIS — Z12.31 SCREENING MAMMOGRAM, ENCOUNTER FOR: ICD-10-CM

## 2019-02-20 PROCEDURE — 77063 MAMMO DIGITAL SCREENING BILAT WITH TOMOSYNTHESIS_CAD: ICD-10-PCS | Mod: 26,,, | Performed by: RADIOLOGY

## 2019-02-20 PROCEDURE — 77067 MAMMO DIGITAL SCREENING BILAT WITH TOMOSYNTHESIS_CAD: ICD-10-PCS | Mod: 26,,, | Performed by: RADIOLOGY

## 2019-02-20 PROCEDURE — 77080 DXA BONE DENSITY AXIAL: CPT | Mod: 26,,, | Performed by: INTERNAL MEDICINE

## 2019-02-20 PROCEDURE — 77067 SCR MAMMO BI INCL CAD: CPT | Mod: TC,PO

## 2019-02-20 PROCEDURE — 77063 BREAST TOMOSYNTHESIS BI: CPT | Mod: 26,,, | Performed by: RADIOLOGY

## 2019-02-20 PROCEDURE — 77067 SCR MAMMO BI INCL CAD: CPT | Mod: 26,,, | Performed by: RADIOLOGY

## 2019-02-20 PROCEDURE — 77080 DEXA BONE DENSITY SPINE HIP: ICD-10-PCS | Mod: 26,,, | Performed by: INTERNAL MEDICINE

## 2019-02-20 PROCEDURE — 77080 DXA BONE DENSITY AXIAL: CPT | Mod: TC

## 2019-02-22 NOTE — PROGRESS NOTES
Your Bone density test shows OSTEOPENIA - In between normal bone strength and weakened bone strength with osteoporosis.     You are at slightly increased risk for hip fracture, spinal compression fracture in the future, but the experts do not recommend taking a prescription medication at this time    WEIGHT BEARING EXERCISE (carrying light weights) is the BEST way to strengthen the bone where your muscles insert & prevent future fractures.     Focus on 1200 mg of CALCIUM daily  - Which is the equivalent of 3 glasses of milk daily. If you cannot tolerate this, you can substitute yogurt or cheese for one of these servings.  Eat foods rich in calcium.Also you can take TUMS supplements or Viactiv chocolate chews calcium supplement.     Also, 800 units of VITAMIN D daily - This is the equivalent of 10 minutes of direct sunlight daily. If you are not in the sun, consider Foods rich in vitamin D and over the counter  supplement .     Let's repeat this test in 2 years. This is a test that is easily done on the same day as your mammogram

## 2019-03-06 RX ORDER — ATORVASTATIN CALCIUM 20 MG/1
TABLET, FILM COATED ORAL
Qty: 90 TABLET | Refills: 3 | Status: SHIPPED | OUTPATIENT
Start: 2019-03-06 | End: 2020-02-06

## 2019-05-13 RX ORDER — LOSARTAN POTASSIUM 50 MG/1
TABLET ORAL
Qty: 90 TABLET | Refills: 3 | Status: SHIPPED | OUTPATIENT
Start: 2019-05-13 | End: 2020-04-14

## 2020-02-06 RX ORDER — ATORVASTATIN CALCIUM 20 MG/1
TABLET, FILM COATED ORAL
Qty: 90 TABLET | Refills: 4 | Status: SHIPPED | OUTPATIENT
Start: 2020-02-06 | End: 2021-05-02

## 2020-04-14 RX ORDER — LOSARTAN POTASSIUM 50 MG/1
TABLET ORAL
Qty: 90 TABLET | Refills: 0 | Status: SHIPPED | OUTPATIENT
Start: 2020-04-14 | End: 2020-07-13

## 2020-06-12 ENCOUNTER — PATIENT MESSAGE (OUTPATIENT)
Dept: CARDIOLOGY | Facility: CLINIC | Age: 70
End: 2020-06-12

## 2020-06-12 NOTE — TELEPHONE ENCOUNTER
Spoke with pt, offered to schedule follow up visit for August with pt. Pt states she wishes to log in to her [employer] account and schedule her own appointment.

## 2020-06-16 ENCOUNTER — TELEPHONE (OUTPATIENT)
Dept: PRIMARY CARE CLINIC | Facility: CLINIC | Age: 70
End: 2020-06-16

## 2020-06-16 DIAGNOSIS — D50.8 IRON DEFICIENCY ANEMIA SECONDARY TO INADEQUATE DIETARY IRON INTAKE: ICD-10-CM

## 2020-06-16 DIAGNOSIS — E78.00 HYPERCHOLESTEREMIA: ICD-10-CM

## 2020-06-16 DIAGNOSIS — Z85.850 HX OF THYROID CANCER: ICD-10-CM

## 2020-06-16 DIAGNOSIS — I11.9 HYPERTENSIVE LEFT VENTRICULAR HYPERTROPHY, WITHOUT HEART FAILURE: Primary | ICD-10-CM

## 2020-07-14 ENCOUNTER — LAB VISIT (OUTPATIENT)
Dept: LAB | Facility: HOSPITAL | Age: 70
End: 2020-07-14
Attending: SPECIALIST
Payer: COMMERCIAL

## 2020-07-14 DIAGNOSIS — E04.1 THYROID NODULE: Primary | ICD-10-CM

## 2020-07-14 LAB
T3 SERPL-MCNC: 96 NG/DL (ref 60–180)
T4 FREE SERPL-MCNC: 1.17 NG/DL (ref 0.71–1.51)
TSH SERPL DL<=0.005 MIU/L-ACNC: 0.07 UIU/ML (ref 0.4–4)

## 2020-07-14 PROCEDURE — 84480 ASSAY TRIIODOTHYRONINE (T3): CPT

## 2020-07-14 PROCEDURE — 84439 ASSAY OF FREE THYROXINE: CPT

## 2020-07-14 PROCEDURE — 84439 ASSAY OF FREE THYROXINE: CPT | Mod: 91

## 2020-07-14 PROCEDURE — 84443 ASSAY THYROID STIM HORMONE: CPT

## 2020-07-22 LAB — T4 FREE SERPL DIALY-MCNC: 2.2 NG/DL (ref 0.8–2)

## 2020-10-16 ENCOUNTER — LAB VISIT (OUTPATIENT)
Dept: LAB | Facility: HOSPITAL | Age: 70
End: 2020-10-16
Attending: FAMILY MEDICINE
Payer: COMMERCIAL

## 2020-10-16 DIAGNOSIS — I11.9 HYPERTENSIVE LEFT VENTRICULAR HYPERTROPHY, WITHOUT HEART FAILURE: ICD-10-CM

## 2020-10-16 DIAGNOSIS — E78.00 HYPERCHOLESTEREMIA: ICD-10-CM

## 2020-10-16 DIAGNOSIS — Z85.850 HX OF THYROID CANCER: ICD-10-CM

## 2020-10-16 DIAGNOSIS — D50.8 IRON DEFICIENCY ANEMIA SECONDARY TO INADEQUATE DIETARY IRON INTAKE: ICD-10-CM

## 2020-10-16 LAB
ALBUMIN SERPL BCP-MCNC: 3.6 G/DL (ref 3.5–5.2)
ALP SERPL-CCNC: 50 U/L (ref 55–135)
ALT SERPL W/O P-5'-P-CCNC: 12 U/L (ref 10–44)
ANION GAP SERPL CALC-SCNC: 5 MMOL/L (ref 8–16)
AST SERPL-CCNC: 12 U/L (ref 10–40)
BASOPHILS # BLD AUTO: 0.05 K/UL (ref 0–0.2)
BASOPHILS NFR BLD: 1 % (ref 0–1.9)
BILIRUB SERPL-MCNC: 0.4 MG/DL (ref 0.1–1)
BUN SERPL-MCNC: 12 MG/DL (ref 8–23)
CALCIUM SERPL-MCNC: 8.7 MG/DL (ref 8.7–10.5)
CHLORIDE SERPL-SCNC: 108 MMOL/L (ref 95–110)
CHOLEST SERPL-MCNC: 121 MG/DL (ref 120–199)
CHOLEST/HDLC SERPL: 2.5 {RATIO} (ref 2–5)
CO2 SERPL-SCNC: 29 MMOL/L (ref 23–29)
CREAT SERPL-MCNC: 0.6 MG/DL (ref 0.5–1.4)
DIFFERENTIAL METHOD: ABNORMAL
EOSINOPHIL # BLD AUTO: 0.2 K/UL (ref 0–0.5)
EOSINOPHIL NFR BLD: 3.1 % (ref 0–8)
ERYTHROCYTE [DISTWIDTH] IN BLOOD BY AUTOMATED COUNT: 14 % (ref 11.5–14.5)
EST. GFR  (AFRICAN AMERICAN): >60 ML/MIN/1.73 M^2
EST. GFR  (NON AFRICAN AMERICAN): >60 ML/MIN/1.73 M^2
GLUCOSE SERPL-MCNC: 98 MG/DL (ref 70–110)
HCT VFR BLD AUTO: 35.8 % (ref 37–48.5)
HDLC SERPL-MCNC: 49 MG/DL (ref 40–75)
HDLC SERPL: 40.5 % (ref 20–50)
HGB BLD-MCNC: 11.4 G/DL (ref 12–16)
IMM GRANULOCYTES # BLD AUTO: 0.01 K/UL (ref 0–0.04)
IMM GRANULOCYTES NFR BLD AUTO: 0.2 % (ref 0–0.5)
LDLC SERPL CALC-MCNC: 61.6 MG/DL (ref 63–159)
LYMPHOCYTES # BLD AUTO: 2 K/UL (ref 1–4.8)
LYMPHOCYTES NFR BLD: 40.9 % (ref 18–48)
MCH RBC QN AUTO: 32.1 PG (ref 27–31)
MCHC RBC AUTO-ENTMCNC: 31.8 G/DL (ref 32–36)
MCV RBC AUTO: 101 FL (ref 82–98)
MONOCYTES # BLD AUTO: 0.5 K/UL (ref 0.3–1)
MONOCYTES NFR BLD: 10.2 % (ref 4–15)
NEUTROPHILS # BLD AUTO: 2.2 K/UL (ref 1.8–7.7)
NEUTROPHILS NFR BLD: 44.6 % (ref 38–73)
NONHDLC SERPL-MCNC: 72 MG/DL
NRBC BLD-RTO: 0 /100 WBC
PLATELET # BLD AUTO: 249 K/UL (ref 150–350)
PMV BLD AUTO: 10.8 FL (ref 9.2–12.9)
POTASSIUM SERPL-SCNC: 4 MMOL/L (ref 3.5–5.1)
PROT SERPL-MCNC: 6.7 G/DL (ref 6–8.4)
RBC # BLD AUTO: 3.55 M/UL (ref 4–5.4)
SODIUM SERPL-SCNC: 142 MMOL/L (ref 136–145)
T4 FREE SERPL-MCNC: 1.3 NG/DL (ref 0.71–1.51)
TRIGL SERPL-MCNC: 52 MG/DL (ref 30–150)
TSH SERPL DL<=0.005 MIU/L-ACNC: 0.04 UIU/ML (ref 0.4–4)
WBC # BLD AUTO: 4.91 K/UL (ref 3.9–12.7)

## 2020-10-16 PROCEDURE — 36415 COLL VENOUS BLD VENIPUNCTURE: CPT | Mod: PN

## 2020-10-16 PROCEDURE — 84443 ASSAY THYROID STIM HORMONE: CPT

## 2020-10-16 PROCEDURE — 84439 ASSAY OF FREE THYROXINE: CPT

## 2020-10-16 PROCEDURE — 85025 COMPLETE CBC W/AUTO DIFF WBC: CPT

## 2020-10-16 PROCEDURE — 80053 COMPREHEN METABOLIC PANEL: CPT

## 2020-10-16 PROCEDURE — 80061 LIPID PANEL: CPT

## 2020-10-22 ENCOUNTER — OFFICE VISIT (OUTPATIENT)
Dept: PRIMARY CARE CLINIC | Facility: CLINIC | Age: 70
End: 2020-10-22
Payer: COMMERCIAL

## 2020-10-22 VITALS
WEIGHT: 194.69 LBS | HEART RATE: 71 BPM | BODY MASS INDEX: 35.83 KG/M2 | HEIGHT: 62 IN | DIASTOLIC BLOOD PRESSURE: 80 MMHG | SYSTOLIC BLOOD PRESSURE: 130 MMHG | OXYGEN SATURATION: 98 %

## 2020-10-22 DIAGNOSIS — K21.9 GASTROESOPHAGEAL REFLUX DISEASE, UNSPECIFIED WHETHER ESOPHAGITIS PRESENT: ICD-10-CM

## 2020-10-22 DIAGNOSIS — D50.8 IRON DEFICIENCY ANEMIA SECONDARY TO INADEQUATE DIETARY IRON INTAKE: ICD-10-CM

## 2020-10-22 DIAGNOSIS — E78.2 MIXED HYPERLIPIDEMIA: ICD-10-CM

## 2020-10-22 DIAGNOSIS — E89.0 POSTOPERATIVE HYPOTHYROIDISM: ICD-10-CM

## 2020-10-22 DIAGNOSIS — Z85.850 HX OF THYROID CANCER: ICD-10-CM

## 2020-10-22 DIAGNOSIS — Z23 NEED FOR INFLUENZA VACCINATION: ICD-10-CM

## 2020-10-22 DIAGNOSIS — I11.9 HYPERTENSIVE LEFT VENTRICULAR HYPERTROPHY, WITHOUT HEART FAILURE: ICD-10-CM

## 2020-10-22 DIAGNOSIS — Z00.00 ROUTINE GENERAL MEDICAL EXAMINATION AT A HEALTH CARE FACILITY: Primary | ICD-10-CM

## 2020-10-22 PROBLEM — Z91.89 FRAMINGHAM CARDIAC RISK 10-20% IN NEXT 10 YEARS: Status: RESOLVED | Noted: 2018-02-01 | Resolved: 2020-10-22

## 2020-10-22 PROCEDURE — 99397 PR PREVENTIVE VISIT,EST,65 & OVER: ICD-10-PCS | Mod: 25,S$GLB,, | Performed by: FAMILY MEDICINE

## 2020-10-22 PROCEDURE — 3075F PR MOST RECENT SYSTOLIC BLOOD PRESS GE 130-139MM HG: ICD-10-PCS | Mod: CPTII,S$GLB,, | Performed by: FAMILY MEDICINE

## 2020-10-22 PROCEDURE — 3079F DIAST BP 80-89 MM HG: CPT | Mod: CPTII,S$GLB,, | Performed by: FAMILY MEDICINE

## 2020-10-22 PROCEDURE — 99397 PER PM REEVAL EST PAT 65+ YR: CPT | Mod: 25,S$GLB,, | Performed by: FAMILY MEDICINE

## 2020-10-22 PROCEDURE — 99999 PR PBB SHADOW E&M-EST. PATIENT-LVL III: CPT | Mod: PBBFAC,,, | Performed by: FAMILY MEDICINE

## 2020-10-22 PROCEDURE — 90471 FLU VACCINE - QUADRIVALENT - ADJUVANTED: ICD-10-PCS | Mod: S$GLB,,, | Performed by: FAMILY MEDICINE

## 2020-10-22 PROCEDURE — 90471 IMMUNIZATION ADMIN: CPT | Mod: S$GLB,,, | Performed by: FAMILY MEDICINE

## 2020-10-22 PROCEDURE — 90694 VACC AIIV4 NO PRSRV 0.5ML IM: CPT | Mod: S$GLB,,, | Performed by: FAMILY MEDICINE

## 2020-10-22 PROCEDURE — 3075F SYST BP GE 130 - 139MM HG: CPT | Mod: CPTII,S$GLB,, | Performed by: FAMILY MEDICINE

## 2020-10-22 PROCEDURE — 90694 FLU VACCINE - QUADRIVALENT - ADJUVANTED: ICD-10-PCS | Mod: S$GLB,,, | Performed by: FAMILY MEDICINE

## 2020-10-22 PROCEDURE — 99999 PR PBB SHADOW E&M-EST. PATIENT-LVL III: ICD-10-PCS | Mod: PBBFAC,,, | Performed by: FAMILY MEDICINE

## 2020-10-22 PROCEDURE — 3079F PR MOST RECENT DIASTOLIC BLOOD PRESSURE 80-89 MM HG: ICD-10-PCS | Mod: CPTII,S$GLB,, | Performed by: FAMILY MEDICINE

## 2020-10-22 NOTE — PROGRESS NOTES
Two patient identifiers verified.  Allergies reviewed.   Flu IM administered to left deltoid per MD order.  Patient tolerated injection well; no redness, bleeding, or bruising noted to injection site.  Patient instructed to remain in clinic setting for 15 minutes.  Verbalizes understanding.

## 2020-10-22 NOTE — Clinical Note
Hi Dr Guerrier! Nurys Damon will call for colonoscopy this year 2020  & talk about possible EGD for reflux symtoms

## 2020-10-22 NOTE — PROGRESS NOTES
Subjective:      Patient ID: Nurys Do is a 70 y.o. female.    Chief Complaint: Annual Exam    Here today for general exam.     Walking bid 30 min each. But not losing weight. Eating great. Has history of thyroid cancer & saw ENT Dr Quinn a few months ago & he continued levothyroxine 200 daily.     By 3 pm I'm tired - for the past year. My day starts 5 am    Normal mammo 2019    Denies any chest pain, shortness of breath, nausea vomiting constipation diarrhea, blood in stool,     Marixa vernon godwin other day for heartburn - fried, sweets, ETOH, repeat food. Never had EGD      Current Outpatient Medications:     atorvastatin (LIPITOR) 20 MG tablet, TAKE 1 TABLET DAILY, Disp: 90 tablet, Rfl: 4    fluticasone (FLONASE) 50 mcg/actuation nasal spray, instill 2 sprays into each nostril once daily (Patient taking differently: instill 2 sprays into each nostril as needed), Disp: 16 g, Rfl: 11    levothyroxine (SYNTHROID) 200 MCG tablet, Take 1 tablet (200 mcg total) by mouth before breakfast., Disp: 90 tablet, Rfl: 3    losartan (COZAAR) 50 MG tablet, TAKE 1 TABLET DAILY, Disp: 90 tablet, Rfl: 3    aspirin 81 MG Chew, Take 1 tablet (81 mg total) by mouth once daily., Disp: , Rfl: 0    Lab Results   Component Value Date    HGBA1C 5.2 02/06/2019     No results found for: MICALBCREAT  Lab Results   Component Value Date    LDLCALC 61.6 (L) 10/16/2020    LDLCALC 70.6 02/04/2019    CHOL 121 10/16/2020    HDL 49 10/16/2020    TRIG 52 10/16/2020       Lab Results   Component Value Date     10/16/2020    K 4.0 10/16/2020     10/16/2020    CO2 29 10/16/2020    GLU 98 10/16/2020    BUN 12 10/16/2020    CREATININE 0.6 10/16/2020    CALCIUM 8.7 10/16/2020    PROT 6.7 10/16/2020    ALBUMIN 3.6 10/16/2020    BILITOT 0.4 10/16/2020    ALKPHOS 50 (L) 10/16/2020    AST 12 10/16/2020    ALT 12 10/16/2020    ANIONGAP 5 (L) 10/16/2020    ESTGFRAFRICA >60.0 10/16/2020    EGFRNONAA >60.0 10/16/2020    WBC 4.91  "10/16/2020    HGB 11.4 (L) 10/16/2020    HGB 11.5 (L) 02/06/2019    HCT 35.8 (L) 10/16/2020     (H) 10/16/2020     10/16/2020    TSH 0.036 (L) 10/16/2020    HEPCAB Negative 08/23/2016           Past Medical History:   Diagnosis Date    Diverticulosis     colonoscopy 2011, Dr Guerrier    GERD (gastroesophageal reflux disease) 10/22/2020    rec EGD 2020 Dr Guerrier    HTN (hypertension), benign 2/1/2018    Hypertensive left ventricular hypertrophy, without heart failure 02/01/2018    Dr Ellsworth    Iron deficiency anemia secondary to inadequate dietary iron intake     2012 [facility] Dr Guerrier diverticulosis    Knee pain, left     eval in PT with pes planus, L foot, hip & knee pain, Dr Tirado takes Meloxicam 15 mg daily    Mixed hyperlipidemia 2/15/2018    Osteopenia 1/6/2016    Postoperative hypothyroidism 10/22/2020    Thyroid cancer     Dr Quinn     Past Surgical History:   Procedure Laterality Date    right foot surgery  2017    Dr Garcia    THYROIDECTOMY      x2, Dr Quinn     Social History     Social History Narrative    , 5 children, nonsmoker, ETOH socially, GYN here nml 2014, colonoscopy with diverticulosis 2011 Dr Guerrier     Family History   Problem Relation Age of Onset    Hypertension Mother     Dementia Mother     Colon cancer Father     Heart disease Father      Vitals:    10/22/20 0951 10/22/20 1019   BP: (!) 140/83 130/80   Pulse: 71    SpO2: 98%    Weight: 88.3 kg (194 lb 10.7 oz)    Height: 5' 2" (1.575 m)      Objective:   Physical Exam  Constitutional:       Appearance: She is well-developed.   HENT:      Head: Normocephalic and atraumatic.      Nose:      Comments: Wearing mask due to current COVID 19 pandemic, Nose & mouth exam deferred  Eyes:      Pupils: Pupils are equal, round, and reactive to light.   Neck:      Musculoskeletal: Neck supple.      Thyroid: No thyromegaly.   Cardiovascular:      Rate and Rhythm: Normal rate and regular rhythm.      Heart sounds: " Normal heart sounds. No murmur.   Pulmonary:      Effort: Pulmonary effort is normal.      Breath sounds: Normal breath sounds. No wheezing.   Abdominal:      General: Bowel sounds are normal. There is no distension.      Palpations: Abdomen is soft. There is no mass.      Tenderness: There is no abdominal tenderness. There is no guarding or rebound.   Lymphadenopathy:      Cervical: No cervical adenopathy.   Skin:     General: Skin is warm and dry.   Neurological:      Mental Status: She is alert and oriented to person, place, and time.   Psychiatric:         Behavior: Behavior normal.       Assessment:     1. Routine general medical examination at a health care facility    2. Hypertensive left ventricular hypertrophy, without heart failure    3. Mixed hyperlipidemia    4. Iron deficiency anemia secondary to inadequate dietary iron intake    5. Hx of thyroid cancer    6. Postoperative hypothyroidism    7. Need for influenza vaccination    8. Gastroesophageal reflux disease, unspecified whether esophagitis present      Plan:     Orders Placed This Encounter    Influenza (FLUAD) - Quadrivalent (Adjuvanted) *Preferred* (65+) (PF)     Follow with ENT Dr Quinn    She will call for colonoscopy with Dr Guerrier this year 2020 - talk with him about possible EGD for reflux symtoms    Continue meds    ==============================================  FOR HIGH BLOOD PRESSURE (HYPERTENSION)-------------    Take your medication every day     Try to stop these things that can elevate blood pressure: salt, caffeine, energy drinks, diet pills, sudafed, alcohol , taking NSAIDS daily (advil, alleve, ibuprofen, naproxen) and birth control    DECREASE ALCOHOL CONSUMPTION    DECREASE SALT (fast foods, frozen, canned, processed foods, ham, turkey, fried foods, chips, crackers, etc) & drink 8 glasses of water a day with minimal caffeine ( 1 cup a day)   ==============================================    Due for  Vaccines  - at your  pharmacy    ==============================================================  I'd like to advise you on current CANCER SCREENING recommendations:  ~~~~~~~~~~~~~~~~~~~~~~~~~~~~~~~~~~~~~~~~~~~~~~~~~~~~~~~~~~~~~  PAP SMEAR to evaluate for cervical cancer screening  Every 3 years (or 30 - 64 yo, every 5 years with HPV co-testing).   MAMMOGRAM  every 1-2 years, from 50 - 74 years old. We can discuss your risk at 40 & determine whether to get mammogram sooner  Of course, I can perform this sooner if there is family history of cancer, or if you have problems or questions    ==============================  RECOMMENDATIONS FOR FEMALES  ==============================  Your #1 MEDICINE is DAILY EXERCISE - 15-20 minutes of huffing & puffing EVERY DAY.     Prevent the #1 cause of death- cardiovascular disease (HEART ATTACK & STROKE) by checking for normal blood pressure, cholesterol, sugars, & by not smoking.     VACCINES: Yearly FLU shot, PNEUMONIA shot after 65,  SHINGLES shot after 50    Screening colonoscopy at AGE  50 & every 10 years to check for COLON CANCER,  one of the most common & preventable cancers (Or FIT kit yearly) Repeat in 3 years if POLYP found     I recommend  high fiber (5 fresh fruits or vegetables daily), low fat diet and aerobic  exercise (huffing/ puffing/ sweating for 20 min straight at least 4 days a week)    Follow up yearly with mammogram, fasting lipids, CMP, CBC prior.   ==============================================================      There are no Patient Instructions on file for this visit.                            Answers for HPI/ROS submitted by the patient on 10/17/2020   activity change: No  unexpected weight change: No  neck pain: No  hearing loss: No  rhinorrhea: No  trouble swallowing: No  eye discharge: No  visual disturbance: No  chest tightness: No  wheezing: No  chest pain: No  palpitations: No  blood in stool: No  constipation: No  vomiting: No  diarrhea: No  polydipsia:  No  polyuria: No  difficulty urinating: No  hematuria: No  menstrual problem: No  dysuria: No  joint swelling: No  arthralgias: Yes  headaches: No  weakness: Yes  confusion: No  dysphoric mood: No

## 2020-12-31 ENCOUNTER — PATIENT MESSAGE (OUTPATIENT)
Dept: ADMINISTRATIVE | Facility: OTHER | Age: 70
End: 2020-12-31

## 2021-01-06 ENCOUNTER — PATIENT OUTREACH (OUTPATIENT)
Dept: ADMINISTRATIVE | Facility: OTHER | Age: 71
End: 2021-01-06

## 2021-01-06 ENCOUNTER — IMMUNIZATION (OUTPATIENT)
Dept: FAMILY MEDICINE | Facility: CLINIC | Age: 71
End: 2021-01-06
Payer: COMMERCIAL

## 2021-01-06 DIAGNOSIS — Z23 NEED FOR VACCINATION: ICD-10-CM

## 2021-01-06 DIAGNOSIS — Z12.31 ENCOUNTER FOR SCREENING MAMMOGRAM FOR MALIGNANT NEOPLASM OF BREAST: Primary | ICD-10-CM

## 2021-01-06 PROCEDURE — 91300 COVID-19, MRNA, LNP-S, PF, 30 MCG/0.3 ML DOSE VACCINE: CPT | Mod: PBBFAC | Performed by: FAMILY MEDICINE

## 2021-01-07 ENCOUNTER — OFFICE VISIT (OUTPATIENT)
Dept: CARDIOLOGY | Facility: CLINIC | Age: 71
End: 2021-01-07
Payer: COMMERCIAL

## 2021-01-07 VITALS
BODY MASS INDEX: 35.15 KG/M2 | WEIGHT: 191 LBS | HEART RATE: 64 BPM | SYSTOLIC BLOOD PRESSURE: 154 MMHG | HEIGHT: 62 IN | DIASTOLIC BLOOD PRESSURE: 90 MMHG

## 2021-01-07 DIAGNOSIS — E78.00 PURE HYPERCHOLESTEROLEMIA: ICD-10-CM

## 2021-01-07 DIAGNOSIS — I25.84 CORONARY ARTERY DISEASE DUE TO CALCIFIED CORONARY LESION: ICD-10-CM

## 2021-01-07 DIAGNOSIS — I25.10 CORONARY ARTERY DISEASE DUE TO CALCIFIED CORONARY LESION: ICD-10-CM

## 2021-01-07 DIAGNOSIS — E03.9 HYPOTHYROIDISM, UNSPECIFIED TYPE: ICD-10-CM

## 2021-01-07 DIAGNOSIS — I10 ESSENTIAL HYPERTENSION: Primary | ICD-10-CM

## 2021-01-07 PROCEDURE — 3080F PR MOST RECENT DIASTOLIC BLOOD PRESSURE >= 90 MM HG: ICD-10-PCS | Mod: CPTII,S$GLB,, | Performed by: INTERNAL MEDICINE

## 2021-01-07 PROCEDURE — 3008F PR BODY MASS INDEX (BMI) DOCUMENTED: ICD-10-PCS | Mod: CPTII,S$GLB,, | Performed by: INTERNAL MEDICINE

## 2021-01-07 PROCEDURE — 1159F PR MEDICATION LIST DOCUMENTED IN MEDICAL RECORD: ICD-10-PCS | Mod: S$GLB,,, | Performed by: INTERNAL MEDICINE

## 2021-01-07 PROCEDURE — 3288F FALL RISK ASSESSMENT DOCD: CPT | Mod: CPTII,S$GLB,, | Performed by: INTERNAL MEDICINE

## 2021-01-07 PROCEDURE — 99999 PR PBB SHADOW E&M-EST. PATIENT-LVL III: ICD-10-PCS | Mod: PBBFAC,,, | Performed by: INTERNAL MEDICINE

## 2021-01-07 PROCEDURE — 1126F PR PAIN SEVERITY QUANTIFIED, NO PAIN PRESENT: ICD-10-PCS | Mod: S$GLB,,, | Performed by: INTERNAL MEDICINE

## 2021-01-07 PROCEDURE — 93005 ELECTROCARDIOGRAM TRACING: CPT | Mod: S$GLB,,, | Performed by: INTERNAL MEDICINE

## 2021-01-07 PROCEDURE — 99999 PR PBB SHADOW E&M-EST. PATIENT-LVL III: CPT | Mod: PBBFAC,,, | Performed by: INTERNAL MEDICINE

## 2021-01-07 PROCEDURE — 3077F SYST BP >= 140 MM HG: CPT | Mod: CPTII,S$GLB,, | Performed by: INTERNAL MEDICINE

## 2021-01-07 PROCEDURE — 99214 PR OFFICE/OUTPT VISIT, EST, LEVL IV, 30-39 MIN: ICD-10-PCS | Mod: S$GLB,,, | Performed by: INTERNAL MEDICINE

## 2021-01-07 PROCEDURE — 3288F PR FALLS RISK ASSESSMENT DOCUMENTED: ICD-10-PCS | Mod: CPTII,S$GLB,, | Performed by: INTERNAL MEDICINE

## 2021-01-07 PROCEDURE — 3008F BODY MASS INDEX DOCD: CPT | Mod: CPTII,S$GLB,, | Performed by: INTERNAL MEDICINE

## 2021-01-07 PROCEDURE — 93010 ELECTROCARDIOGRAM REPORT: CPT | Mod: S$GLB,,, | Performed by: INTERNAL MEDICINE

## 2021-01-07 PROCEDURE — 99214 OFFICE O/P EST MOD 30 MIN: CPT | Mod: S$GLB,,, | Performed by: INTERNAL MEDICINE

## 2021-01-07 PROCEDURE — 1101F PR PT FALLS ASSESS DOC 0-1 FALLS W/OUT INJ PAST YR: ICD-10-PCS | Mod: CPTII,S$GLB,, | Performed by: INTERNAL MEDICINE

## 2021-01-07 PROCEDURE — 93010 EKG 12-LEAD: ICD-10-PCS | Mod: S$GLB,,, | Performed by: INTERNAL MEDICINE

## 2021-01-07 PROCEDURE — 93005 EKG 12-LEAD: ICD-10-PCS | Mod: S$GLB,,, | Performed by: INTERNAL MEDICINE

## 2021-01-07 PROCEDURE — 1159F MED LIST DOCD IN RCRD: CPT | Mod: S$GLB,,, | Performed by: INTERNAL MEDICINE

## 2021-01-07 PROCEDURE — 1101F PT FALLS ASSESS-DOCD LE1/YR: CPT | Mod: CPTII,S$GLB,, | Performed by: INTERNAL MEDICINE

## 2021-01-07 PROCEDURE — 1126F AMNT PAIN NOTED NONE PRSNT: CPT | Mod: S$GLB,,, | Performed by: INTERNAL MEDICINE

## 2021-01-07 PROCEDURE — 3080F DIAST BP >= 90 MM HG: CPT | Mod: CPTII,S$GLB,, | Performed by: INTERNAL MEDICINE

## 2021-01-07 PROCEDURE — 3077F PR MOST RECENT SYSTOLIC BLOOD PRESSURE >= 140 MM HG: ICD-10-PCS | Mod: CPTII,S$GLB,, | Performed by: INTERNAL MEDICINE

## 2021-01-07 RX ORDER — AMLODIPINE BESYLATE 2.5 MG/1
2.5 TABLET ORAL DAILY
Qty: 30 TABLET | Refills: 11 | Status: SHIPPED | OUTPATIENT
Start: 2021-01-07 | End: 2021-04-06 | Stop reason: SDUPTHER

## 2021-01-27 ENCOUNTER — IMMUNIZATION (OUTPATIENT)
Dept: FAMILY MEDICINE | Facility: CLINIC | Age: 71
End: 2021-01-27
Payer: COMMERCIAL

## 2021-01-27 DIAGNOSIS — Z23 NEED FOR VACCINATION: Primary | ICD-10-CM

## 2021-01-27 PROCEDURE — 0002A COVID-19, MRNA, LNP-S, PF, 30 MCG/0.3 ML DOSE VACCINE: CPT | Mod: PBBFAC | Performed by: FAMILY MEDICINE

## 2021-01-27 PROCEDURE — 91300 COVID-19, MRNA, LNP-S, PF, 30 MCG/0.3 ML DOSE VACCINE: CPT | Mod: PBBFAC | Performed by: FAMILY MEDICINE

## 2021-02-10 ENCOUNTER — PATIENT MESSAGE (OUTPATIENT)
Dept: CARDIOLOGY | Facility: CLINIC | Age: 71
End: 2021-02-10

## 2021-02-10 DIAGNOSIS — I25.10 CORONARY ARTERY DISEASE DUE TO CALCIFIED CORONARY LESION: Primary | ICD-10-CM

## 2021-02-10 DIAGNOSIS — Z01.818 PREOPERATIVE CLEARANCE: ICD-10-CM

## 2021-02-10 DIAGNOSIS — I25.84 CORONARY ARTERY DISEASE DUE TO CALCIFIED CORONARY LESION: Primary | ICD-10-CM

## 2021-02-12 ENCOUNTER — HOSPITAL ENCOUNTER (OUTPATIENT)
Dept: CARDIOLOGY | Facility: HOSPITAL | Age: 71
Discharge: HOME | End: 2021-02-12
Attending: INTERNAL MEDICINE
Payer: COMMERCIAL

## 2021-02-12 VITALS
WEIGHT: 191 LBS | RESPIRATION RATE: 16 BRPM | SYSTOLIC BLOOD PRESSURE: 140 MMHG | HEART RATE: 70 BPM | DIASTOLIC BLOOD PRESSURE: 70 MMHG | HEIGHT: 62 IN | BODY MASS INDEX: 35.15 KG/M2

## 2021-02-12 DIAGNOSIS — I25.10 CORONARY ARTERY DISEASE DUE TO CALCIFIED CORONARY LESION: ICD-10-CM

## 2021-02-12 DIAGNOSIS — Z01.818 PREOPERATIVE CLEARANCE: ICD-10-CM

## 2021-02-12 DIAGNOSIS — I25.84 CORONARY ARTERY DISEASE DUE TO CALCIFIED CORONARY LESION: ICD-10-CM

## 2021-02-12 LAB
ASCENDING AORTA: 3.12 CM
BSA FOR ECHO PROCEDURE: 1.95 M2
CV ECHO LV RWT: 0.47 CM
CV STRESS BASE HR: 70 BPM
DIASTOLIC BLOOD PRESSURE: 75 MMHG
DOP CALC LVOT AREA: 3.6 CM2
DOP CALC LVOT DIAMETER: 2.14 CM
DOP CALC LVOT PEAK VEL: 0.95 M/S
DOP CALC LVOT STROKE VOLUME: 75.03 CM3
DOP CALCLVOT PEAK VEL VTI: 20.87 CM
E WAVE DECELERATION TIME: 247.72 MSEC
E/A RATIO: 0.7
E/E' RATIO: 7.75 M/S
ECHO LV POSTERIOR WALL: 0.97 CM (ref 0.6–1.1)
FRACTIONAL SHORTENING: 41 % (ref 28–44)
INTERVENTRICULAR SEPTUM: 1.01 CM (ref 0.6–1.1)
IVRT: 119.89 MSEC
LA MAJOR: 5.32 CM
LA MINOR: 5.34 CM
LA WIDTH: 3.87 CM
LEFT ATRIUM SIZE: 3.63 CM
LEFT ATRIUM VOLUME INDEX: 34 ML/M2
LEFT ATRIUM VOLUME: 63.64 CM3
LEFT INTERNAL DIMENSION IN SYSTOLE: 2.47 CM (ref 2.1–4)
LEFT VENTRICLE DIASTOLIC VOLUME INDEX: 41.23 ML/M2
LEFT VENTRICLE DIASTOLIC VOLUME: 77.1 ML
LEFT VENTRICLE MASS INDEX: 72 G/M2
LEFT VENTRICLE SYSTOLIC VOLUME INDEX: 11.5 ML/M2
LEFT VENTRICLE SYSTOLIC VOLUME: 21.55 ML
LEFT VENTRICULAR INTERNAL DIMENSION IN DIASTOLE: 4.17 CM (ref 3.5–6)
LEFT VENTRICULAR MASS: 133.8 G
LV LATERAL E/E' RATIO: 6.2 M/S
LV SEPTAL E/E' RATIO: 10.33 M/S
MV A" WAVE DURATION": 9.71 MSEC
MV PEAK A VEL: 0.88 M/S
MV PEAK E VEL: 0.62 M/S
OHS CV CPX 1 MINUTE RECOVERY HEART RATE: 123 BPM
OHS CV CPX 85 PERCENT MAX PREDICTED HEART RATE MALE: 121
OHS CV CPX MAX PREDICTED HEART RATE: 143
OHS CV CPX PATIENT IS FEMALE: 1
OHS CV CPX PATIENT IS MALE: 0
OHS CV CPX PEAK DIASTOLIC BLOOD PRESSURE: 43 MMHG
OHS CV CPX PEAK HEAR RATE: 136 BPM
OHS CV CPX PEAK RATE PRESSURE PRODUCT: NORMAL
OHS CV CPX PEAK SYSTOLIC BLOOD PRESSURE: 140 MMHG
OHS CV CPX PERCENT MAX PREDICTED HEART RATE ACHIEVED: 95
OHS CV CPX RATE PRESSURE PRODUCT PRESENTING: NORMAL
PISA TR MAX VEL: 2.31 M/S
PULM VEIN S/D RATIO: 2.36
PV PEAK D VEL: 0.45 M/S
PV PEAK S VEL: 1.06 M/S
RA MAJOR: 5.29 CM
RA PRESSURE: 3 MMHG
RA WIDTH: 2.88 CM
RIGHT VENTRICULAR END-DIASTOLIC DIMENSION: 3.53 CM
RV TISSUE DOPPLER FREE WALL SYSTOLIC VELOCITY 1 (APICAL 4 CHAMBER VIEW): 15.3 CM/S
SINUS: 3.44 CM
STJ: 2.9 CM
SYSTOLIC BLOOD PRESSURE: 153 MMHG
TDI LATERAL: 0.1 M/S
TDI SEPTAL: 0.06 M/S
TDI: 0.08 M/S
TR MAX PG: 21 MMHG
TRICUSPID ANNULAR PLANE SYSTOLIC EXCURSION: 2.48 CM
TV REST PULMONARY ARTERY PRESSURE: 24 MMHG

## 2021-02-12 PROCEDURE — 63600175 PHARM REV CODE 636 W HCPCS: Performed by: INTERNAL MEDICINE

## 2021-02-12 PROCEDURE — 93351 STRESS ECHO (CUPID ONLY): ICD-10-PCS | Mod: 26,,, | Performed by: INTERNAL MEDICINE

## 2021-02-12 PROCEDURE — 93351 STRESS TTE COMPLETE: CPT | Mod: 26,,, | Performed by: INTERNAL MEDICINE

## 2021-02-12 PROCEDURE — 93351 STRESS TTE COMPLETE: CPT

## 2021-02-12 RX ORDER — DOBUTAMINE HYDROCHLORIDE 400 MG/100ML
10 INJECTION INTRAVENOUS
Status: COMPLETED | OUTPATIENT
Start: 2021-02-12 | End: 2021-02-12

## 2021-02-12 RX ADMIN — DOBUTAMINE HYDROCHLORIDE 10 MCG/KG/MIN: 400 INJECTION INTRAVENOUS at 03:02

## 2021-02-15 ENCOUNTER — PATIENT MESSAGE (OUTPATIENT)
Dept: CARDIOLOGY | Facility: CLINIC | Age: 71
End: 2021-02-15

## 2021-02-17 ENCOUNTER — TELEPHONE (OUTPATIENT)
Dept: CARDIOLOGY | Facility: CLINIC | Age: 71
End: 2021-02-17

## 2021-03-04 ENCOUNTER — PATIENT OUTREACH (OUTPATIENT)
Dept: ADMINISTRATIVE | Facility: HOSPITAL | Age: 71
End: 2021-03-04

## 2021-03-18 ENCOUNTER — PATIENT OUTREACH (OUTPATIENT)
Dept: ADMINISTRATIVE | Facility: HOSPITAL | Age: 71
End: 2021-03-18

## 2021-03-18 ENCOUNTER — PATIENT MESSAGE (OUTPATIENT)
Dept: RESEARCH | Facility: HOSPITAL | Age: 71
End: 2021-03-18

## 2021-03-26 ENCOUNTER — PATIENT MESSAGE (OUTPATIENT)
Dept: RESEARCH | Facility: HOSPITAL | Age: 71
End: 2021-03-26

## 2021-04-05 ENCOUNTER — PATIENT MESSAGE (OUTPATIENT)
Dept: ADMINISTRATIVE | Facility: HOSPITAL | Age: 71
End: 2021-04-05

## 2021-04-06 DIAGNOSIS — I10 ESSENTIAL HYPERTENSION: ICD-10-CM

## 2021-04-07 RX ORDER — AMLODIPINE BESYLATE 2.5 MG/1
2.5 TABLET ORAL DAILY
Qty: 90 TABLET | Refills: 3 | Status: SHIPPED | OUTPATIENT
Start: 2021-04-07 | End: 2022-03-08 | Stop reason: SDUPTHER

## 2021-07-07 ENCOUNTER — PATIENT MESSAGE (OUTPATIENT)
Dept: ADMINISTRATIVE | Facility: HOSPITAL | Age: 71
End: 2021-07-07

## 2021-07-12 ENCOUNTER — LAB VISIT (OUTPATIENT)
Dept: LAB | Facility: HOSPITAL | Age: 71
End: 2021-07-12
Payer: COMMERCIAL

## 2021-07-12 DIAGNOSIS — C73 MALIGNANT NEOPLASM OF THYROID GLAND: Primary | ICD-10-CM

## 2021-07-12 LAB
T3 SERPL-MCNC: 96 NG/DL (ref 60–180)
T4 FREE SERPL-MCNC: 1.5 NG/DL (ref 0.71–1.51)
T4 SERPL-MCNC: 11.7 UG/DL (ref 4.5–11.5)
TSH SERPL DL<=0.005 MIU/L-ACNC: 0.01 UIU/ML (ref 0.4–4)

## 2021-07-12 PROCEDURE — 84480 ASSAY TRIIODOTHYRONINE (T3): CPT

## 2021-07-12 PROCEDURE — 84436 ASSAY OF TOTAL THYROXINE: CPT

## 2021-07-12 PROCEDURE — 84439 ASSAY OF FREE THYROXINE: CPT

## 2021-07-12 PROCEDURE — 36415 COLL VENOUS BLD VENIPUNCTURE: CPT | Mod: PN

## 2021-07-12 PROCEDURE — 84443 ASSAY THYROID STIM HORMONE: CPT

## 2021-09-24 ENCOUNTER — PATIENT MESSAGE (OUTPATIENT)
Dept: ADMINISTRATIVE | Facility: OTHER | Age: 71
End: 2021-09-24

## 2021-09-27 ENCOUNTER — IMMUNIZATION (OUTPATIENT)
Dept: INTERNAL MEDICINE | Facility: CLINIC | Age: 71
End: 2021-09-27
Payer: COMMERCIAL

## 2021-09-27 DIAGNOSIS — Z23 NEED FOR VACCINATION: Primary | ICD-10-CM

## 2021-09-27 PROCEDURE — 91300 COVID-19, MRNA, LNP-S, PF, 30 MCG/0.3 ML DOSE VACCINE: CPT | Mod: PBBFAC | Performed by: INTERNAL MEDICINE

## 2021-09-27 PROCEDURE — 0003A COVID-19, MRNA, LNP-S, PF, 30 MCG/0.3 ML DOSE VACCINE: CPT | Mod: CV19,PBBFAC | Performed by: INTERNAL MEDICINE

## 2021-10-05 ENCOUNTER — PATIENT MESSAGE (OUTPATIENT)
Dept: ADMINISTRATIVE | Facility: HOSPITAL | Age: 71
End: 2021-10-05

## 2021-12-09 ENCOUNTER — CLINICAL SUPPORT (OUTPATIENT)
Dept: URGENT CARE | Facility: CLINIC | Age: 71
End: 2021-12-09
Payer: COMMERCIAL

## 2021-12-09 VITALS — TEMPERATURE: 97 F

## 2021-12-09 DIAGNOSIS — Z23 ENCOUNTER FOR ADMINISTRATION OF VACCINE: Primary | ICD-10-CM

## 2021-12-09 PROCEDURE — 90694 VACC AIIV4 NO PRSRV 0.5ML IM: CPT | Mod: S$GLB,,, | Performed by: STUDENT IN AN ORGANIZED HEALTH CARE EDUCATION/TRAINING PROGRAM

## 2021-12-09 PROCEDURE — 90471 FLU VACCINE - QUADRIVALENT - ADJUVANTED: ICD-10-PCS | Mod: S$GLB,,, | Performed by: STUDENT IN AN ORGANIZED HEALTH CARE EDUCATION/TRAINING PROGRAM

## 2021-12-09 PROCEDURE — 90471 IMMUNIZATION ADMIN: CPT | Mod: S$GLB,,, | Performed by: STUDENT IN AN ORGANIZED HEALTH CARE EDUCATION/TRAINING PROGRAM

## 2021-12-09 PROCEDURE — 90694 FLU VACCINE - QUADRIVALENT - ADJUVANTED: ICD-10-PCS | Mod: S$GLB,,, | Performed by: STUDENT IN AN ORGANIZED HEALTH CARE EDUCATION/TRAINING PROGRAM

## 2022-01-13 DIAGNOSIS — I11.9 HYPERTENSIVE LEFT VENTRICULAR HYPERTROPHY, WITHOUT HEART FAILURE: Primary | ICD-10-CM

## 2022-01-14 ENCOUNTER — TELEPHONE (OUTPATIENT)
Dept: PRIMARY CARE CLINIC | Facility: CLINIC | Age: 72
End: 2022-01-14
Payer: COMMERCIAL

## 2022-01-14 DIAGNOSIS — Z00.00 ROUTINE GENERAL MEDICAL EXAMINATION AT A HEALTH CARE FACILITY: Primary | ICD-10-CM

## 2022-01-14 RX ORDER — LOSARTAN POTASSIUM 50 MG/1
50 TABLET ORAL DAILY
Qty: 90 TABLET | Refills: 0 | Status: SHIPPED | OUTPATIENT
Start: 2022-01-14 | End: 2022-04-14

## 2022-01-14 NOTE — TELEPHONE ENCOUNTER
LOV 10/22/2020  [facility] message has been sent to the patient, to inform them that an appointment is overdue.

## 2022-01-18 ENCOUNTER — PATIENT MESSAGE (OUTPATIENT)
Dept: ADMINISTRATIVE | Facility: HOSPITAL | Age: 72
End: 2022-01-18
Payer: COMMERCIAL

## 2022-01-27 ENCOUNTER — HOSPITAL ENCOUNTER (OUTPATIENT)
Dept: RADIOLOGY | Facility: HOSPITAL | Age: 72
Discharge: HOME | End: 2022-01-27
Attending: FAMILY MEDICINE
Payer: COMMERCIAL

## 2022-01-27 VITALS — BODY MASS INDEX: 34.96 KG/M2 | HEIGHT: 62 IN | WEIGHT: 190 LBS

## 2022-01-27 DIAGNOSIS — Z12.31 ENCOUNTER FOR SCREENING MAMMOGRAM FOR MALIGNANT NEOPLASM OF BREAST: ICD-10-CM

## 2022-01-27 PROCEDURE — 77067 MAMMO DIGITAL SCREENING BILAT WITH TOMO: ICD-10-PCS | Mod: 26,,, | Performed by: RADIOLOGY

## 2022-01-27 PROCEDURE — 77063 BREAST TOMOSYNTHESIS BI: CPT | Mod: 26,,, | Performed by: RADIOLOGY

## 2022-01-27 PROCEDURE — 77067 SCR MAMMO BI INCL CAD: CPT | Mod: TC,PN

## 2022-01-27 PROCEDURE — 77067 SCR MAMMO BI INCL CAD: CPT | Mod: 26,,, | Performed by: RADIOLOGY

## 2022-01-27 PROCEDURE — 77063 MAMMO DIGITAL SCREENING BILAT WITH TOMO: ICD-10-PCS | Mod: 26,,, | Performed by: RADIOLOGY

## 2022-01-31 NOTE — PROGRESS NOTES
Good news, your mammogram was normal.     Let's plan to repeat your mammogram in one year (some people choose to wait 2 years).     As a reminder, a mammogram is a screening test and not perfect. A normal mammogram does not outweigh a palpable mass. If you notice a mass in your breast this needs to be evaluated regardless of your recent mammogram results.      Sincerely, Dr. Meeta Maciel

## 2022-03-07 ENCOUNTER — PATIENT MESSAGE (OUTPATIENT)
Dept: CARDIOLOGY | Facility: CLINIC | Age: 72
End: 2022-03-07
Payer: COMMERCIAL

## 2022-03-07 DIAGNOSIS — I10 ESSENTIAL HYPERTENSION: ICD-10-CM

## 2022-03-08 DIAGNOSIS — I10 ESSENTIAL HYPERTENSION: ICD-10-CM

## 2022-03-08 RX ORDER — AMLODIPINE BESYLATE 2.5 MG/1
2.5 TABLET ORAL DAILY
Qty: 30 TABLET | Refills: 1 | Status: SHIPPED | OUTPATIENT
Start: 2022-03-08 | End: 2022-03-08

## 2022-03-08 RX ORDER — AMLODIPINE BESYLATE 2.5 MG/1
TABLET ORAL
Qty: 30 TABLET | Refills: 0 | Status: SHIPPED | OUTPATIENT
Start: 2022-03-08 | End: 2022-04-05

## 2022-03-11 ENCOUNTER — TELEPHONE (OUTPATIENT)
Dept: PRIMARY CARE CLINIC | Facility: CLINIC | Age: 72
End: 2022-03-11
Payer: COMMERCIAL

## 2022-03-11 ENCOUNTER — PATIENT MESSAGE (OUTPATIENT)
Dept: PRIMARY CARE CLINIC | Facility: CLINIC | Age: 72
End: 2022-03-11
Payer: COMMERCIAL

## 2022-03-11 NOTE — TELEPHONE ENCOUNTER
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

## 2022-04-05 DIAGNOSIS — I10 ESSENTIAL HYPERTENSION: ICD-10-CM

## 2022-04-05 RX ORDER — AMLODIPINE BESYLATE 2.5 MG/1
TABLET ORAL
Qty: 90 TABLET | Refills: 3 | Status: SHIPPED | OUTPATIENT
Start: 2022-04-05 | End: 2023-01-17

## 2022-04-13 DIAGNOSIS — I11.9 HYPERTENSIVE LEFT VENTRICULAR HYPERTROPHY, WITHOUT HEART FAILURE: ICD-10-CM

## 2022-04-14 RX ORDER — LOSARTAN POTASSIUM 50 MG/1
TABLET ORAL
Qty: 90 TABLET | Refills: 3 | Status: SHIPPED | OUTPATIENT
Start: 2022-04-14 | End: 2023-04-10

## 2022-04-14 NOTE — TELEPHONE ENCOUNTER
Care Due:                  Date            Visit Type   Department     Provider  --------------------------------------------------------------------------------                                PHYSICAL -                              ESTABLISHED   [facility]  Last Visit: 10-      PATIENT      CARE           Meeta Maciel                              [facility]                              ANNUAL                              CHECKUP/PHY  [facility]  Next Visit: 06-      S            CARE           Meeta Maciel                                                            Last  Test          Frequency    Reason                     Performed    Due Date  --------------------------------------------------------------------------------    CMP.........  12 months..  losartan.................  10-   10-    Powered by [employer] by [email or web address]. Reference number: [id number].   4/14/2022 7:58:21 AM CDT

## 2022-04-14 NOTE — TELEPHONE ENCOUNTER
Refill Routing Note   Medication(s) are not appropriate for processing by [facility] for the following reason(s):      - Patient has not been seen in over 15 months by PCP  - Required laboratory values are outdated    [facility] action(s):  Defer          Medication reconciliation completed: No     Appointments  past 12m or future 3m with PCP    Date Provider   Last Visit   10/22/2020 Meeta aMciel MD   Next Visit   6/23/2022 Meeta Maciel MD   ED visits in past 90 days: 0        Note composed:7:58 AM 04/14/2022

## 2022-06-08 PROBLEM — C73 PAPILLARY THYROID CARCINOMA: Status: ACTIVE | Noted: 2022-06-08

## 2022-06-16 ENCOUNTER — LAB VISIT (OUTPATIENT)
Dept: LAB | Facility: HOSPITAL | Age: 72
End: 2022-06-16
Attending: FAMILY MEDICINE
Payer: COMMERCIAL

## 2022-06-16 DIAGNOSIS — I11.9 HYPERTENSIVE LEFT VENTRICULAR HYPERTROPHY, WITHOUT HEART FAILURE: ICD-10-CM

## 2022-06-16 DIAGNOSIS — E78.00 PURE HYPERCHOLESTEROLEMIA: ICD-10-CM

## 2022-06-16 DIAGNOSIS — D50.8 IRON DEFICIENCY ANEMIA SECONDARY TO INADEQUATE DIETARY IRON INTAKE: ICD-10-CM

## 2022-06-16 DIAGNOSIS — Z85.850 HX OF THYROID CANCER: ICD-10-CM

## 2022-06-16 LAB
ALBUMIN SERPL BCP-MCNC: 3.9 G/DL (ref 3.5–5.2)
ALP SERPL-CCNC: 48 U/L (ref 55–135)
ALT SERPL W/O P-5'-P-CCNC: 15 U/L (ref 10–44)
ANION GAP SERPL CALC-SCNC: 9 MMOL/L (ref 8–16)
AST SERPL-CCNC: 16 U/L (ref 10–40)
BASOPHILS # BLD AUTO: 0.05 K/UL (ref 0–0.2)
BASOPHILS NFR BLD: 0.9 % (ref 0–1.9)
BILIRUB SERPL-MCNC: 0.5 MG/DL (ref 0.1–1)
BUN SERPL-MCNC: 12 MG/DL (ref 8–23)
CALCIUM SERPL-MCNC: 9.5 MG/DL (ref 8.7–10.5)
CHLORIDE SERPL-SCNC: 108 MMOL/L (ref 95–110)
CHOLEST SERPL-MCNC: 139 MG/DL (ref 120–199)
CHOLEST/HDLC SERPL: 2.4 {RATIO} (ref 2–5)
CO2 SERPL-SCNC: 25 MMOL/L (ref 23–29)
CREAT SERPL-MCNC: 0.7 MG/DL (ref 0.5–1.4)
DIFFERENTIAL METHOD: ABNORMAL
EOSINOPHIL # BLD AUTO: 0.2 K/UL (ref 0–0.5)
EOSINOPHIL NFR BLD: 3.2 % (ref 0–8)
ERYTHROCYTE [DISTWIDTH] IN BLOOD BY AUTOMATED COUNT: 14 % (ref 11.5–14.5)
EST. GFR  (AFRICAN AMERICAN): >60 ML/MIN/1.73 M^2
EST. GFR  (NON AFRICAN AMERICAN): >60 ML/MIN/1.73 M^2
GLUCOSE SERPL-MCNC: 96 MG/DL (ref 70–110)
HCT VFR BLD AUTO: 37.9 % (ref 37–48.5)
HDLC SERPL-MCNC: 57 MG/DL (ref 40–75)
HDLC SERPL: 41 % (ref 20–50)
HGB BLD-MCNC: 12.1 G/DL (ref 12–16)
IMM GRANULOCYTES # BLD AUTO: 0.01 K/UL (ref 0–0.04)
IMM GRANULOCYTES NFR BLD AUTO: 0.2 % (ref 0–0.5)
LDLC SERPL CALC-MCNC: 70 MG/DL (ref 63–159)
LYMPHOCYTES # BLD AUTO: 2.3 K/UL (ref 1–4.8)
LYMPHOCYTES NFR BLD: 41.2 % (ref 18–48)
MCH RBC QN AUTO: 32.4 PG (ref 27–31)
MCHC RBC AUTO-ENTMCNC: 31.9 G/DL (ref 32–36)
MCV RBC AUTO: 101 FL (ref 82–98)
MONOCYTES # BLD AUTO: 0.5 K/UL (ref 0.3–1)
MONOCYTES NFR BLD: 8 % (ref 4–15)
NEUTROPHILS # BLD AUTO: 2.6 K/UL (ref 1.8–7.7)
NEUTROPHILS NFR BLD: 46.5 % (ref 38–73)
NONHDLC SERPL-MCNC: 82 MG/DL
NRBC BLD-RTO: 0 /100 WBC
PLATELET # BLD AUTO: 285 K/UL (ref 150–450)
PMV BLD AUTO: 10.9 FL (ref 9.2–12.9)
POTASSIUM SERPL-SCNC: 3.8 MMOL/L (ref 3.5–5.1)
PROT SERPL-MCNC: 7 G/DL (ref 6–8.4)
RBC # BLD AUTO: 3.74 M/UL (ref 4–5.4)
SODIUM SERPL-SCNC: 142 MMOL/L (ref 136–145)
T3 SERPL-MCNC: 89 NG/DL (ref 60–180)
T4 FREE SERPL-MCNC: 1.3 NG/DL (ref 0.71–1.51)
TRIGL SERPL-MCNC: 60 MG/DL (ref 30–150)
TSH SERPL DL<=0.005 MIU/L-ACNC: 0.06 UIU/ML (ref 0.4–4)
WBC # BLD AUTO: 5.65 K/UL (ref 3.9–12.7)

## 2022-06-16 PROCEDURE — 80061 LIPID PANEL: CPT | Performed by: FAMILY MEDICINE

## 2022-06-16 PROCEDURE — 85025 COMPLETE CBC W/AUTO DIFF WBC: CPT | Performed by: FAMILY MEDICINE

## 2022-06-16 PROCEDURE — 84443 ASSAY THYROID STIM HORMONE: CPT | Performed by: FAMILY MEDICINE

## 2022-06-16 PROCEDURE — 84439 ASSAY OF FREE THYROXINE: CPT | Performed by: FAMILY MEDICINE

## 2022-06-16 PROCEDURE — 36415 COLL VENOUS BLD VENIPUNCTURE: CPT | Mod: PN | Performed by: FAMILY MEDICINE

## 2022-06-16 PROCEDURE — 84480 ASSAY TRIIODOTHYRONINE (T3): CPT | Performed by: FAMILY MEDICINE

## 2022-06-16 PROCEDURE — 80053 COMPREHEN METABOLIC PANEL: CPT | Performed by: FAMILY MEDICINE

## 2022-06-23 ENCOUNTER — OFFICE VISIT (OUTPATIENT)
Dept: PRIMARY CARE CLINIC | Facility: CLINIC | Age: 72
End: 2022-06-23
Payer: COMMERCIAL

## 2022-06-23 ENCOUNTER — TELEPHONE (OUTPATIENT)
Dept: PRIMARY CARE CLINIC | Facility: CLINIC | Age: 72
End: 2022-06-23

## 2022-06-23 VITALS
BODY MASS INDEX: 36.63 KG/M2 | TEMPERATURE: 98 F | HEART RATE: 79 BPM | SYSTOLIC BLOOD PRESSURE: 136 MMHG | HEIGHT: 62 IN | DIASTOLIC BLOOD PRESSURE: 78 MMHG | WEIGHT: 199.06 LBS | OXYGEN SATURATION: 97 %

## 2022-06-23 DIAGNOSIS — I11.9 HYPERTENSIVE LEFT VENTRICULAR HYPERTROPHY, WITHOUT HEART FAILURE: ICD-10-CM

## 2022-06-23 DIAGNOSIS — R63.5 WEIGHT GAIN: ICD-10-CM

## 2022-06-23 DIAGNOSIS — Z98.890 HISTORY OF ANKLE SURGERY: ICD-10-CM

## 2022-06-23 DIAGNOSIS — Z00.00 ROUTINE GENERAL MEDICAL EXAMINATION AT A HEALTH CARE FACILITY: Primary | ICD-10-CM

## 2022-06-23 DIAGNOSIS — D50.8 IRON DEFICIENCY ANEMIA SECONDARY TO INADEQUATE DIETARY IRON INTAKE: ICD-10-CM

## 2022-06-23 DIAGNOSIS — E78.2 MIXED HYPERLIPIDEMIA: ICD-10-CM

## 2022-06-23 DIAGNOSIS — E89.0 POSTOPERATIVE HYPOTHYROIDISM: ICD-10-CM

## 2022-06-23 DIAGNOSIS — K21.9 GASTROESOPHAGEAL REFLUX DISEASE, UNSPECIFIED WHETHER ESOPHAGITIS PRESENT: ICD-10-CM

## 2022-06-23 DIAGNOSIS — Z85.850 HX OF THYROID CANCER: ICD-10-CM

## 2022-06-23 DIAGNOSIS — M19.90 ARTHRITIS: ICD-10-CM

## 2022-06-23 PROCEDURE — 3008F BODY MASS INDEX DOCD: CPT | Mod: CPTII,S$GLB,, | Performed by: FAMILY MEDICINE

## 2022-06-23 PROCEDURE — 99397 PR PREVENTIVE VISIT,EST,65 & OVER: ICD-10-PCS | Mod: S$GLB,,, | Performed by: FAMILY MEDICINE

## 2022-06-23 PROCEDURE — 1101F PT FALLS ASSESS-DOCD LE1/YR: CPT | Mod: CPTII,S$GLB,, | Performed by: FAMILY MEDICINE

## 2022-06-23 PROCEDURE — 99999 PR PBB SHADOW E&M-EST. PATIENT-LVL IV: ICD-10-PCS | Mod: PBBFAC,,, | Performed by: FAMILY MEDICINE

## 2022-06-23 PROCEDURE — 1160F PR REVIEW ALL MEDS BY PRESCRIBER/CLIN PHARMACIST DOCUMENTED: ICD-10-PCS | Mod: CPTII,S$GLB,, | Performed by: FAMILY MEDICINE

## 2022-06-23 PROCEDURE — 1160F RVW MEDS BY RX/DR IN RCRD: CPT | Mod: CPTII,S$GLB,, | Performed by: FAMILY MEDICINE

## 2022-06-23 PROCEDURE — 4010F PR ACE/ARB THEARPY RXD/TAKEN: ICD-10-PCS | Mod: CPTII,S$GLB,, | Performed by: FAMILY MEDICINE

## 2022-06-23 PROCEDURE — 99397 PER PM REEVAL EST PAT 65+ YR: CPT | Mod: S$GLB,,, | Performed by: FAMILY MEDICINE

## 2022-06-23 PROCEDURE — 3008F PR BODY MASS INDEX (BMI) DOCUMENTED: ICD-10-PCS | Mod: CPTII,S$GLB,, | Performed by: FAMILY MEDICINE

## 2022-06-23 PROCEDURE — 3288F FALL RISK ASSESSMENT DOCD: CPT | Mod: CPTII,S$GLB,, | Performed by: FAMILY MEDICINE

## 2022-06-23 PROCEDURE — 3078F DIAST BP <80 MM HG: CPT | Mod: CPTII,S$GLB,, | Performed by: FAMILY MEDICINE

## 2022-06-23 PROCEDURE — 1126F AMNT PAIN NOTED NONE PRSNT: CPT | Mod: CPTII,S$GLB,, | Performed by: FAMILY MEDICINE

## 2022-06-23 PROCEDURE — 3075F PR MOST RECENT SYSTOLIC BLOOD PRESS GE 130-139MM HG: ICD-10-PCS | Mod: CPTII,S$GLB,, | Performed by: FAMILY MEDICINE

## 2022-06-23 PROCEDURE — 1126F PR PAIN SEVERITY QUANTIFIED, NO PAIN PRESENT: ICD-10-PCS | Mod: CPTII,S$GLB,, | Performed by: FAMILY MEDICINE

## 2022-06-23 PROCEDURE — 4010F ACE/ARB THERAPY RXD/TAKEN: CPT | Mod: CPTII,S$GLB,, | Performed by: FAMILY MEDICINE

## 2022-06-23 PROCEDURE — 3288F PR FALLS RISK ASSESSMENT DOCUMENTED: ICD-10-PCS | Mod: CPTII,S$GLB,, | Performed by: FAMILY MEDICINE

## 2022-06-23 PROCEDURE — 1159F PR MEDICATION LIST DOCUMENTED IN MEDICAL RECORD: ICD-10-PCS | Mod: CPTII,S$GLB,, | Performed by: FAMILY MEDICINE

## 2022-06-23 PROCEDURE — 99999 PR PBB SHADOW E&M-EST. PATIENT-LVL IV: CPT | Mod: PBBFAC,,, | Performed by: FAMILY MEDICINE

## 2022-06-23 PROCEDURE — 1101F PR PT FALLS ASSESS DOC 0-1 FALLS W/OUT INJ PAST YR: ICD-10-PCS | Mod: CPTII,S$GLB,, | Performed by: FAMILY MEDICINE

## 2022-06-23 PROCEDURE — 3075F SYST BP GE 130 - 139MM HG: CPT | Mod: CPTII,S$GLB,, | Performed by: FAMILY MEDICINE

## 2022-06-23 PROCEDURE — 1159F MED LIST DOCD IN RCRD: CPT | Mod: CPTII,S$GLB,, | Performed by: FAMILY MEDICINE

## 2022-06-23 PROCEDURE — 3078F PR MOST RECENT DIASTOLIC BLOOD PRESSURE < 80 MM HG: ICD-10-PCS | Mod: CPTII,S$GLB,, | Performed by: FAMILY MEDICINE

## 2022-06-23 RX ORDER — OMEPRAZOLE 20 MG/1
20 CAPSULE, DELAYED RELEASE ORAL 2 TIMES DAILY PRN
COMMUNITY
Start: 2022-06-02

## 2022-06-23 RX ORDER — METFORMIN HYDROCHLORIDE 500 MG/1
500 TABLET ORAL NIGHTLY
Qty: 90 TABLET | Refills: 3 | Status: SHIPPED | OUTPATIENT
Start: 2022-06-23 | End: 2023-02-03

## 2022-06-23 NOTE — TELEPHONE ENCOUNTER
----- Message from Meeta Maciel MD sent at 6/23/2022 12:21 PM CDT -----  Please send recent labs, willy TSH, T3 & T4 to ENT DR Quinn at [facility]

## 2022-06-23 NOTE — PATIENT INSTRUCTIONS
Metformin 500mg once daily for weight loss    Try fish oil & tumeric for joint aches    Follow with GYN for female health & cancer prevention    Continue meds    Follow w Dr Quinn    ==============================  RECOMMENDATIONS FOR FEMALES  ==============================  Your #1 MEDICINE is DAILY EXERCISE - 15-20 minutes of huffing & puffing EVERY DAY.     Prevent the #1 cause of death- cardiovascular disease (HEART ATTACK & STROKE) by checking for normal blood pressure, cholesterol, sugars, & by not smoking.     VACCINES: Yearly FLU shot, PNEUMONIA shot after 65,  SHINGLES shot after 50    COLON CANCER screening colonoscopy starting at 44 yo &  every 10 years (or Cologuard kit every 3 yrs) , repeat test sooner if POLYP is found    I recommend  high fiber (5 fresh fruits or vegetables daily), low fat diet and aerobic  exercise (huffing/ puffing/ sweating for 20 min straight at least 4 days a week)    Follow up yearly with mammogram, fasting lipids, CMP, CBC prior.   ==============================================================

## 2022-06-23 NOTE — PROGRESS NOTES
Subjective:      Patient ID: MaryLee B Harris is a 71 y.o. female.    Chief Complaint: Annual Exam    Here today for general exam.     BP normal, follows w Cardiologist Dr Ellsworth    Follows with ENT Dr Quinn for hx thyroid cancer & low TSH , levo 200    Every joint aches. No tylenol, no NSAIDS, fish oil, tumeric    Asking about med for weight loss. She is biking. Scared of ozempic w personal hx papillary thyroid  cancer    Denies any chest pain, shortness of breath, nausea vomiting constipation diarrhea, blood in stool, heartburn    Current Outpatient Medications   Medication Instructions    amLODIPine (NORVASC) 2.5 MG tablet TAKE 1 TABLET BY MOUTH EVERY DAY    atorvastatin (LIPITOR) 20 MG tablet TAKE 1 TABLET DAILY    fluticasone (FLONASE) 50 mcg/actuation nasal spray instill 2 sprays into each nostril once daily    levothyroxine (SYNTHROID) 200 mcg, Oral, Before breakfast    losartan (COZAAR) 50 MG tablet TAKE 1 TABLET DAILY    metFORMIN (GLUCOPHAGE) 500 mg, Oral, Nightly    omeprazole (PRILOSEC) 20 mg, Oral, 2 times daily PRN       Lab Results   Component Value Date    HGBA1C 5.2 02/06/2019     No results found for: MICALBCREAT  Lab Results   Component Value Date    LDLCALC 70.0 06/16/2022    LDLCALC 61.6 (L) 10/16/2020    CHOL 139 06/16/2022    HDL 57 06/16/2022    TRIG 60 06/16/2022       Lab Results   Component Value Date     06/16/2022    K 3.8 06/16/2022     06/16/2022    CO2 25 06/16/2022    GLU 96 06/16/2022    BUN 12 06/16/2022    CREATININE 0.7 06/16/2022    CALCIUM 9.5 06/16/2022    PROT 7.0 06/16/2022    ALBUMIN 3.9 06/16/2022    BILITOT 0.5 06/16/2022    ALKPHOS 48 (L) 06/16/2022    AST 16 06/16/2022    ALT 15 06/16/2022    ANIONGAP 9 06/16/2022    ESTGFRAFRICA >60.0 06/16/2022    EGFRNONAA >60.0 06/16/2022    WBC 5.65 06/16/2022    HGB 12.1 06/16/2022    HGB 11.4 (L) 10/16/2020    HCT 37.9 06/16/2022     (H) 06/16/2022     06/16/2022    TSH 0.055 (L) 06/16/2022     "HEPCAB Negative 08/23/2016       Lab Results   Component Value Date    [id number] 33 11/18/2013         Past Medical History:   Diagnosis Date    Arthritis 6/23/2022    Diverticulosis     colonoscopy 2011, Dr Guerrier    GERD (gastroesophageal reflux disease) 10/22/2020    gastritis EGD 2/2021 Dr Guerrier    History of ankle surgery 06/23/2022    Bilateral ankle fusions, Dr Garcia    HTN (hypertension), benign 02/01/2018    Hypertensive left ventricular hypertrophy, without heart failure 02/01/2018    Dr Ellsworth    Knee pain, left     eval in PT with pes planus, L foot, hip & knee pain, Dr Tirado takes Meloxicam 15 mg daily    Mixed hyperlipidemia 02/15/2018    Osteopenia 01/06/2016    Papillary thyroid carcinoma     Dr Quinn, stage 1, no mets    Postoperative hypothyroidism 10/22/2020    Weight gain 6/23/2022    Try metf, avoid ozempic w pap thyr CA     Past Surgical History:   Procedure Laterality Date    right foot surgery  2017    Dr Garcia    THYROIDECTOMY      x2, Dr Quinn     Social History     Social History Narrative    , 5 children, nonsmoker, ETOH socially, GYN here nml 2014, colonoscopy with diverticulosis 2011 Dr Guerrier     Family History   Problem Relation Age of Onset    Hypertension Mother     Dementia Mother     Colon cancer Father     Heart disease Father      Vitals:    06/23/22 1127 06/23/22 1220   BP: (!) 140/80 136/78   Pulse: 79    Temp: 98 °F (36.7 °C)    SpO2: 97%    Weight: 90.3 kg (199 lb 1.2 oz)    Height: 5' 2" (1.575 m)    PainSc: 0-No pain      Objective:   Physical Exam  Constitutional:       Appearance: She is well-developed.   HENT:      Head: Normocephalic and atraumatic.      Nose:      Comments: Wearing mask due to current COVID 19 pandemic, Nose & mouth exam deferred  Eyes:      Pupils: Pupils are equal, round, and reactive to light.   Neck:      Thyroid: No thyromegaly.   Cardiovascular:      Rate and Rhythm: Normal rate and regular rhythm.      " Heart sounds: Normal heart sounds. No murmur heard.  Pulmonary:      Effort: Pulmonary effort is normal.      Breath sounds: Normal breath sounds. No wheezing.   Abdominal:      General: Bowel sounds are normal. There is no distension.      Palpations: Abdomen is soft. There is no mass.      Tenderness: There is no abdominal tenderness. There is no guarding or rebound.   Musculoskeletal:      Cervical back: Neck supple.   Lymphadenopathy:      Cervical: No cervical adenopathy.   Skin:     General: Skin is warm and dry.   Neurological:      Mental Status: She is alert and oriented to person, place, and time.   Psychiatric:         Behavior: Behavior normal.       Assessment:     1. Routine general medical examination at a health care facility    2. Mixed hyperlipidemia    3. Postoperative hypothyroidism    4. Iron deficiency anemia secondary to inadequate dietary iron intake    5. Hypertensive left ventricular hypertrophy, without heart failure    6. Hx of thyroid cancer    7. Gastroesophageal reflux disease, unspecified whether esophagitis present    8. History of ankle surgery    9. Weight gain    10. Arthritis      Plan:     Orders Placed This Encounter    metFORMIN (GLUCOPHAGE) 500 MG tablet   qd    Patient Instructions   Metformin 500mg once daily for weight loss    Try fish oil & tumeric for joint aches    Follow with GYN for female health & cancer prevention    Continue meds    Follow w Dr Quinn    ==============================  RECOMMENDATIONS FOR FEMALES  ==============================  Your #1 MEDICINE is DAILY EXERCISE - 15-20 minutes of huffing & puffing EVERY DAY.     Prevent the #1 cause of death- cardiovascular disease (HEART ATTACK & STROKE) by checking for normal blood pressure, cholesterol, sugars, & by not smoking.     VACCINES: Yearly FLU shot, PNEUMONIA shot after 65,  SHINGLES shot after 50    COLON CANCER screening colonoscopy starting at 46 yo &  every 10 years (or Cologuard kit every 3  yrs) , repeat test sooner if POLYP is found    I recommend  high fiber (5 fresh fruits or vegetables daily), low fat diet and aerobic  exercise (huffing/ puffing/ sweating for 20 min straight at least 4 days a week)    Follow up yearly with mammogram, fasting lipids, CMP, CBC prior.   ==============================================================                                Answers for HPI/ROS submitted by the patient on 6/17/2022  activity change: No  unexpected weight change: No  neck pain: No  hearing loss: No  rhinorrhea: No  trouble swallowing: No  eye discharge: No  visual disturbance: No  chest tightness: No  wheezing: No  chest pain: No  palpitations: No  blood in stool: No  constipation: No  vomiting: No  diarrhea: No  polydipsia: No  polyuria: No  difficulty urinating: No  hematuria: No  menstrual problem: No  dysuria: No  joint swelling: No  arthralgias: Yes  headaches: No  weakness: No  confusion: No  dysphoric mood: No

## 2022-09-15 ENCOUNTER — PATIENT MESSAGE (OUTPATIENT)
Dept: PRIMARY CARE CLINIC | Facility: CLINIC | Age: 72
End: 2022-09-15
Payer: COMMERCIAL

## 2022-09-21 DIAGNOSIS — Z78.0 MENOPAUSE: ICD-10-CM

## 2022-09-27 ENCOUNTER — IMMUNIZATION (OUTPATIENT)
Dept: INTERNAL MEDICINE | Facility: CLINIC | Age: 72
End: 2022-09-27
Payer: COMMERCIAL

## 2022-09-27 DIAGNOSIS — Z23 NEED FOR VACCINATION: Primary | ICD-10-CM

## 2022-09-27 PROCEDURE — 91312 COVID-19, MRNA, LNP-S, BIVALENT BOOSTER, PF, 30 MCG/0.3 ML DOSE: ICD-10-PCS | Mod: S$GLB,,, | Performed by: INTERNAL MEDICINE

## 2022-09-27 PROCEDURE — 0124A COVID-19, MRNA, LNP-S, BIVALENT BOOSTER, PF, 30 MCG/0.3 ML DOSE: CPT | Mod: CV19,PBBFAC | Performed by: INTERNAL MEDICINE

## 2022-09-27 PROCEDURE — 91312 COVID-19, MRNA, LNP-S, BIVALENT BOOSTER, PF, 30 MCG/0.3 ML DOSE: CPT | Mod: S$GLB,,, | Performed by: INTERNAL MEDICINE

## 2022-10-11 ENCOUNTER — IMMUNIZATION (OUTPATIENT)
Dept: INTERNAL MEDICINE | Facility: CLINIC | Age: 72
End: 2022-10-11
Payer: COMMERCIAL

## 2022-10-11 PROCEDURE — 90694 VACC AIIV4 NO PRSRV 0.5ML IM: CPT | Mod: S$GLB,,, | Performed by: INTERNAL MEDICINE

## 2022-10-11 PROCEDURE — 90471 FLU VACCINE - QUADRIVALENT - ADJUVANTED: ICD-10-PCS | Mod: S$GLB,,, | Performed by: INTERNAL MEDICINE

## 2022-10-11 PROCEDURE — 90694 FLU VACCINE - QUADRIVALENT - ADJUVANTED: ICD-10-PCS | Mod: S$GLB,,, | Performed by: INTERNAL MEDICINE

## 2022-10-11 PROCEDURE — 90471 IMMUNIZATION ADMIN: CPT | Mod: S$GLB,,, | Performed by: INTERNAL MEDICINE

## 2022-10-19 ENCOUNTER — APPOINTMENT (OUTPATIENT)
Dept: RADIOLOGY | Facility: CLINIC | Age: 72
End: 2022-10-19
Attending: FAMILY MEDICINE
Payer: COMMERCIAL

## 2022-10-19 DIAGNOSIS — Z78.0 MENOPAUSE: ICD-10-CM

## 2022-10-19 PROCEDURE — 77080 DXA BONE DENSITY AXIAL: CPT | Mod: 26,,, | Performed by: INTERNAL MEDICINE

## 2022-10-19 PROCEDURE — 77080 DXA BONE DENSITY AXIAL: CPT | Mod: TC,PO

## 2022-10-19 PROCEDURE — 77080 DEXA BONE DENSITY SPINE HIP: ICD-10-PCS | Mod: 26,,, | Performed by: INTERNAL MEDICINE

## 2022-10-31 ENCOUNTER — PATIENT MESSAGE (OUTPATIENT)
Dept: PRIMARY CARE CLINIC | Facility: CLINIC | Age: 72
End: 2022-10-31
Payer: COMMERCIAL

## 2022-10-31 NOTE — PROGRESS NOTES
Your Bone Density test has moved from OsteoPENIA to OsteoPOROSIS, which is higher risk of a fracture in the future. Would you like to set up a Virtual visit to discuss options?

## 2022-11-21 ENCOUNTER — LAB VISIT (OUTPATIENT)
Dept: LAB | Facility: HOSPITAL | Age: 72
End: 2022-11-21
Attending: FAMILY MEDICINE
Payer: COMMERCIAL

## 2022-11-21 DIAGNOSIS — E78.00 PURE HYPERCHOLESTEROLEMIA: ICD-10-CM

## 2022-11-21 DIAGNOSIS — I10 ESSENTIAL HYPERTENSION: ICD-10-CM

## 2022-11-21 LAB
ALBUMIN SERPL BCP-MCNC: 3.7 G/DL (ref 3.5–5.2)
ALP SERPL-CCNC: 47 U/L (ref 55–135)
ALT SERPL W/O P-5'-P-CCNC: 14 U/L (ref 10–44)
ANION GAP SERPL CALC-SCNC: 5 MMOL/L (ref 8–16)
AST SERPL-CCNC: 12 U/L (ref 10–40)
BILIRUB SERPL-MCNC: 0.3 MG/DL (ref 0.1–1)
BUN SERPL-MCNC: 14 MG/DL (ref 8–23)
CALCIUM SERPL-MCNC: 9.3 MG/DL (ref 8.7–10.5)
CHLORIDE SERPL-SCNC: 108 MMOL/L (ref 95–110)
CHOLEST SERPL-MCNC: 133 MG/DL (ref 120–199)
CHOLEST/HDLC SERPL: 2.7 {RATIO} (ref 2–5)
CO2 SERPL-SCNC: 26 MMOL/L (ref 23–29)
CREAT SERPL-MCNC: 0.6 MG/DL (ref 0.5–1.4)
EST. GFR  (NO RACE VARIABLE): >60 ML/MIN/1.73 M^2
GLUCOSE SERPL-MCNC: 94 MG/DL (ref 70–110)
HDLC SERPL-MCNC: 49 MG/DL (ref 40–75)
HDLC SERPL: 36.8 % (ref 20–50)
LDLC SERPL CALC-MCNC: 72.2 MG/DL (ref 63–159)
NONHDLC SERPL-MCNC: 84 MG/DL
POTASSIUM SERPL-SCNC: 3.8 MMOL/L (ref 3.5–5.1)
PROT SERPL-MCNC: 6.9 G/DL (ref 6–8.4)
SODIUM SERPL-SCNC: 139 MMOL/L (ref 136–145)
TRIGL SERPL-MCNC: 59 MG/DL (ref 30–150)

## 2022-11-21 PROCEDURE — 80061 LIPID PANEL: CPT | Performed by: INTERNAL MEDICINE

## 2022-11-21 PROCEDURE — 80053 COMPREHEN METABOLIC PANEL: CPT | Performed by: INTERNAL MEDICINE

## 2022-11-21 PROCEDURE — 36415 COLL VENOUS BLD VENIPUNCTURE: CPT | Mod: PN | Performed by: INTERNAL MEDICINE

## 2022-11-23 ENCOUNTER — OFFICE VISIT (OUTPATIENT)
Dept: PRIMARY CARE CLINIC | Facility: CLINIC | Age: 72
End: 2022-11-23
Payer: COMMERCIAL

## 2022-11-23 DIAGNOSIS — E89.0 POSTOPERATIVE HYPOTHYROIDISM: ICD-10-CM

## 2022-11-23 DIAGNOSIS — M81.0 OSTEOPOROSIS, SENILE: Primary | ICD-10-CM

## 2022-11-23 DIAGNOSIS — M41.9 SCOLIOSIS OF LUMBOSACRAL SPINE, UNSPECIFIED SCOLIOSIS TYPE: ICD-10-CM

## 2022-11-23 DIAGNOSIS — C73 PAPILLARY THYROID CARCINOMA: ICD-10-CM

## 2022-11-23 PROCEDURE — 1160F RVW MEDS BY RX/DR IN RCRD: CPT | Mod: CPTII,95,, | Performed by: FAMILY MEDICINE

## 2022-11-23 PROCEDURE — 1159F PR MEDICATION LIST DOCUMENTED IN MEDICAL RECORD: ICD-10-PCS | Mod: CPTII,95,, | Performed by: FAMILY MEDICINE

## 2022-11-23 PROCEDURE — 4010F ACE/ARB THERAPY RXD/TAKEN: CPT | Mod: CPTII,95,, | Performed by: FAMILY MEDICINE

## 2022-11-23 PROCEDURE — 1159F MED LIST DOCD IN RCRD: CPT | Mod: CPTII,95,, | Performed by: FAMILY MEDICINE

## 2022-11-23 PROCEDURE — 99213 PR OFFICE/OUTPT VISIT, EST, LEVL III, 20-29 MIN: ICD-10-PCS | Mod: 95,,, | Performed by: FAMILY MEDICINE

## 2022-11-23 PROCEDURE — 4010F PR ACE/ARB THEARPY RXD/TAKEN: ICD-10-PCS | Mod: CPTII,95,, | Performed by: FAMILY MEDICINE

## 2022-11-23 PROCEDURE — 99213 OFFICE O/P EST LOW 20 MIN: CPT | Mod: 95,,, | Performed by: FAMILY MEDICINE

## 2022-11-23 PROCEDURE — 1160F PR REVIEW ALL MEDS BY PRESCRIBER/CLIN PHARMACIST DOCUMENTED: ICD-10-PCS | Mod: CPTII,95,, | Performed by: FAMILY MEDICINE

## 2022-11-23 RX ORDER — ALENDRONATE SODIUM 70 MG/1
70 TABLET ORAL
Qty: 4 TABLET | Refills: 11 | Status: SHIPPED | OUTPATIENT
Start: 2022-11-23 | End: 2022-12-08

## 2022-11-23 NOTE — PROGRESS NOTES
Subjective:      Patient ID: MaryLee B Harris is a 72 y.o. female.    Chief Complaint: No chief complaint on file.    The patient location is: home  The chief complaint leading to consultation is: osteoporosis    Visit type: audiovisual    Face to Face time with patient: 12 min   minutes of total time spent on the encounter, which includes face to face time and non-face to face time preparing to see the patient (eg, review of tests), Obtaining and/or reviewing separately obtained history, Documenting clinical information in the electronic or other health record, Independently interpreting results (not separately reported) and communicating results to the patient/family/caregiver, or Care coordination (not separately reported).         Each patient to whom he or she provides medical services by telemedicine is:  (1) informed of the relationship between the physician and patient and the respective role of any other health care provider with respect to management of the patient; and (2) notified that he or she may decline to receive medical services by telemedicine and may withdraw from such care at any time.    Notes:     37 yrs ago when pregnant w her dtr Nurys Damon, slipped on water & had hairline fracture. Only broken bone. Ankle surgeries due to tendon & ligaments disorders, not because of fractures. Do not take supplements bc it hurts her stomach, milk, yogurt, salmon 3 x a week, salads daily, lots vegetables.     20 years ago radiation for thyroid cancer. Postmenopausal for 25 years. Took  hormone replacement for a few years.     My dad & daughter Sol had scoliosis. My dad's grandmother had severe kyphosis.     I've been to PT before & they told me I have scoliosis        DEXA 2022  =========    FRAX:     18% risk of a major osteoporotic fracture in the next 10 years.     3.9% risk of hip fracture in the next 10 years.     Impression:     *Osteoporosis based on T-score between -1.0 and -2.5 and elevated risk  based on FRAX  *Fracture risk is high  *Compared with previous DXA, BMD at the lumbar spine has increased by 6.6%, this may be due to degenerative changes, and the BMD at the total hip has declined by 3.8%.     RECOMMENDATIONS:  *Daily calcium intake 9581-9317 mg, dietary sources preferred; Vitamin D 9660-5382 IU daily.  *Weight bearing exercise and fall precautions.  *Recommend medical therapy for osteoporosis. Consider bisphosphonates (alendronate, risedronate, zoledronic acid), or denosumab.  *Repeat BMD in 2 years    Current Outpatient Medications   Medication Instructions    alendronate (FOSAMAX) 70 mg, Oral, Every 7 days    amLODIPine (NORVASC) 2.5 MG tablet TAKE 1 TABLET BY MOUTH EVERY DAY    atorvastatin (LIPITOR) 20 MG tablet TAKE 1 TABLET DAILY    fluticasone (FLONASE) 50 mcg/actuation nasal spray instill 2 sprays into each nostril once daily    levothyroxine (SYNTHROID) 200 mcg, Oral, Before breakfast    losartan (COZAAR) 50 MG tablet TAKE 1 TABLET DAILY    metFORMIN (GLUCOPHAGE) 500 mg, Oral, Nightly    omeprazole (PRILOSEC) 20 mg, Oral, 2 times daily PRN       Lab Results   Component Value Date    HGBA1C 5.2 02/06/2019     No results found for: MICALBCREAT  Lab Results   Component Value Date    LDLCALC 72.2 11/21/2022    LDLCALC 70.0 06/16/2022    CHOL 133 11/21/2022    HDL 49 11/21/2022    TRIG 59 11/21/2022       Lab Results   Component Value Date     11/21/2022    K 3.8 11/21/2022     11/21/2022    CO2 26 11/21/2022    GLU 94 11/21/2022    BUN 14 11/21/2022    CREATININE 0.6 11/21/2022    CALCIUM 9.3 11/21/2022    PROT 6.9 11/21/2022    ALBUMIN 3.7 11/21/2022    BILITOT 0.3 11/21/2022    ALKPHOS 47 (L) 11/21/2022    AST 12 11/21/2022    ALT 14 11/21/2022    ANIONGAP 5 (L) 11/21/2022    ESTGFRAFRICA >60.0 06/16/2022    EGFRNONAA >60.0 06/16/2022    WBC 5.65 06/16/2022    HGB 12.1 06/16/2022    HGB 11.4 (L) 10/16/2020    HCT 37.9 06/16/2022     (H) 06/16/2022     06/16/2022     TSH 0.055 (L) 06/16/2022    HEPCAB Negative 08/23/2016       Lab Results   Component Value Date    [id number] 33 11/18/2013         Past Medical History:   Diagnosis Date    Arthritis 06/23/2022    Diverticulosis     colonoscopy 2011, Dr Guerrier    GERD (gastroesophageal reflux disease) 10/22/2020    gastritis EGD 2/2021 Dr Guerrier    History of ankle surgery 06/23/2022    Bilateral ankle fusions, Dr Garcia    HTN (hypertension), benign 02/01/2018    Hypertensive left ventricular hypertrophy, without heart failure 02/01/2018    Dr Ellsworth    Knee pain, left     eval in PT with pes planus, L foot, hip & knee pain, Dr Tirado takes Meloxicam 15 mg daily    Mixed hyperlipidemia 02/15/2018    Osteoporosis, senile 2022    Papillary thyroid carcinoma     Dr Quinn, stage 1, no mets    Postoperative hypothyroidism 10/22/2020    Scoliosis     noted at PT    Weight gain 06/23/2022    Try metf, avoid ozempic w pap thyr CA     Past Surgical History:   Procedure Laterality Date    right foot surgery  2017    Dr Garcia    THYROIDECTOMY      x2, Dr Quinn     Social History     Social History Narrative    Not on file     Family History   Problem Relation Age of Onset    Hypertension Mother     Dementia Mother     Colon cancer Father     Heart disease Father      There were no vitals filed for this visit.  Objective:   Physical Exam  Assessment:     1. Osteoporosis, senile    2. Scoliosis of lumbosacral spine, unspecified scoliosis type    3. Papillary thyroid carcinoma    4. Postoperative hypothyroidism      Plan:     Orders Placed This Encounter    TSH    T4, Free    T3    Vitamin D    alendronate (FOSAMAX) 70 MG tablet     Your Bone density test shows OSTEOPOROSIS    You are at slightly increased risk for hip fracture, spinal compression fracture in the future, but the experts do not recommend taking a prescription medication at this time    WEIGHT BEARING EXERCISE (carrying light weights) is the BEST way to strengthen the  bone where your muscles insert & prevent future fractures.     Focus on 1200 mg of CALCIUM daily  - Which is the equivalent of 3 glasses of milk daily. If you cannot tolerate this, you can substitute yogurt or cheese for one of these servings.  Eat foods rich in calcium.Also you can take TUMS supplements or Viactiv chocolate chews calcium supplement.     Also, 800 units of VITAMIN D daily - This is the equivalent of 10 minutes of direct sunlight daily. If you are not in the sun, consider Foods rich in vitamin D and over the counter  supplement .     Let's repeat this test in 2 years. This is a test that is easily done on the same day as your mammogram    She is open to PT is needed.     TELEMED  There are no Patient Instructions on file for this visit.

## 2022-12-08 ENCOUNTER — OFFICE VISIT (OUTPATIENT)
Dept: CARDIOLOGY | Facility: CLINIC | Age: 72
End: 2022-12-08
Payer: COMMERCIAL

## 2022-12-08 VITALS
HEIGHT: 63 IN | HEART RATE: 76 BPM | WEIGHT: 188.94 LBS | DIASTOLIC BLOOD PRESSURE: 76 MMHG | SYSTOLIC BLOOD PRESSURE: 120 MMHG | BODY MASS INDEX: 33.48 KG/M2

## 2022-12-08 DIAGNOSIS — Z85.850 HX OF THYROID CANCER: ICD-10-CM

## 2022-12-08 DIAGNOSIS — I25.10 CORONARY ARTERY DISEASE DUE TO CALCIFIED CORONARY LESION: ICD-10-CM

## 2022-12-08 DIAGNOSIS — I10 PRIMARY HYPERTENSION: ICD-10-CM

## 2022-12-08 DIAGNOSIS — I25.84 CORONARY ARTERY DISEASE DUE TO CALCIFIED CORONARY LESION: ICD-10-CM

## 2022-12-08 DIAGNOSIS — E78.2 MIXED HYPERLIPIDEMIA: Primary | ICD-10-CM

## 2022-12-08 PROCEDURE — 1159F PR MEDICATION LIST DOCUMENTED IN MEDICAL RECORD: ICD-10-PCS | Mod: CPTII,S$GLB,, | Performed by: INTERNAL MEDICINE

## 2022-12-08 PROCEDURE — 1126F PR PAIN SEVERITY QUANTIFIED, NO PAIN PRESENT: ICD-10-PCS | Mod: CPTII,S$GLB,, | Performed by: INTERNAL MEDICINE

## 2022-12-08 PROCEDURE — 99999 PR PBB SHADOW E&M-EST. PATIENT-LVL IV: CPT | Mod: PBBFAC,,, | Performed by: INTERNAL MEDICINE

## 2022-12-08 PROCEDURE — 1160F RVW MEDS BY RX/DR IN RCRD: CPT | Mod: CPTII,S$GLB,, | Performed by: INTERNAL MEDICINE

## 2022-12-08 PROCEDURE — 1126F AMNT PAIN NOTED NONE PRSNT: CPT | Mod: CPTII,S$GLB,, | Performed by: INTERNAL MEDICINE

## 2022-12-08 PROCEDURE — 3074F PR MOST RECENT SYSTOLIC BLOOD PRESSURE < 130 MM HG: ICD-10-PCS | Mod: CPTII,S$GLB,, | Performed by: INTERNAL MEDICINE

## 2022-12-08 PROCEDURE — 3078F DIAST BP <80 MM HG: CPT | Mod: CPTII,S$GLB,, | Performed by: INTERNAL MEDICINE

## 2022-12-08 PROCEDURE — 1101F PR PT FALLS ASSESS DOC 0-1 FALLS W/OUT INJ PAST YR: ICD-10-PCS | Mod: CPTII,S$GLB,, | Performed by: INTERNAL MEDICINE

## 2022-12-08 PROCEDURE — 99214 PR OFFICE/OUTPT VISIT, EST, LEVL IV, 30-39 MIN: ICD-10-PCS | Mod: S$GLB,,, | Performed by: INTERNAL MEDICINE

## 2022-12-08 PROCEDURE — 4010F PR ACE/ARB THEARPY RXD/TAKEN: ICD-10-PCS | Mod: CPTII,S$GLB,, | Performed by: INTERNAL MEDICINE

## 2022-12-08 PROCEDURE — 99214 OFFICE O/P EST MOD 30 MIN: CPT | Mod: S$GLB,,, | Performed by: INTERNAL MEDICINE

## 2022-12-08 PROCEDURE — 1160F PR REVIEW ALL MEDS BY PRESCRIBER/CLIN PHARMACIST DOCUMENTED: ICD-10-PCS | Mod: CPTII,S$GLB,, | Performed by: INTERNAL MEDICINE

## 2022-12-08 PROCEDURE — 1101F PT FALLS ASSESS-DOCD LE1/YR: CPT | Mod: CPTII,S$GLB,, | Performed by: INTERNAL MEDICINE

## 2022-12-08 PROCEDURE — 93005 EKG 12-LEAD: ICD-10-PCS | Mod: S$GLB,,, | Performed by: INTERNAL MEDICINE

## 2022-12-08 PROCEDURE — 3288F FALL RISK ASSESSMENT DOCD: CPT | Mod: CPTII,S$GLB,, | Performed by: INTERNAL MEDICINE

## 2022-12-08 PROCEDURE — 99999 PR PBB SHADOW E&M-EST. PATIENT-LVL IV: ICD-10-PCS | Mod: PBBFAC,,, | Performed by: INTERNAL MEDICINE

## 2022-12-08 PROCEDURE — 93010 ELECTROCARDIOGRAM REPORT: CPT | Mod: S$GLB,,, | Performed by: INTERNAL MEDICINE

## 2022-12-08 PROCEDURE — 3078F PR MOST RECENT DIASTOLIC BLOOD PRESSURE < 80 MM HG: ICD-10-PCS | Mod: CPTII,S$GLB,, | Performed by: INTERNAL MEDICINE

## 2022-12-08 PROCEDURE — 3074F SYST BP LT 130 MM HG: CPT | Mod: CPTII,S$GLB,, | Performed by: INTERNAL MEDICINE

## 2022-12-08 PROCEDURE — 4010F ACE/ARB THERAPY RXD/TAKEN: CPT | Mod: CPTII,S$GLB,, | Performed by: INTERNAL MEDICINE

## 2022-12-08 PROCEDURE — 3008F PR BODY MASS INDEX (BMI) DOCUMENTED: ICD-10-PCS | Mod: CPTII,S$GLB,, | Performed by: INTERNAL MEDICINE

## 2022-12-08 PROCEDURE — 3008F BODY MASS INDEX DOCD: CPT | Mod: CPTII,S$GLB,, | Performed by: INTERNAL MEDICINE

## 2022-12-08 PROCEDURE — 93010 EKG 12-LEAD: ICD-10-PCS | Mod: S$GLB,,, | Performed by: INTERNAL MEDICINE

## 2022-12-08 PROCEDURE — 3288F PR FALLS RISK ASSESSMENT DOCUMENTED: ICD-10-PCS | Mod: CPTII,S$GLB,, | Performed by: INTERNAL MEDICINE

## 2022-12-08 PROCEDURE — 1159F MED LIST DOCD IN RCRD: CPT | Mod: CPTII,S$GLB,, | Performed by: INTERNAL MEDICINE

## 2022-12-08 PROCEDURE — 93005 ELECTROCARDIOGRAM TRACING: CPT | Mod: S$GLB,,, | Performed by: INTERNAL MEDICINE

## 2022-12-08 NOTE — PROGRESS NOTES
"  Subjective:     Problem List:  Hypertension  Hypercholesterolemia  CACS ~200 in 2/2018  Overweight BMI >35  S/P thyroidectomy (thyroid cancer)  Insulin resistance    HPI:   MaryLee B Harris is a 72 y.o. female who presents for follow-up of Hyperlipidemia and Hypertension  Untreated total chol was 200 w a HDL of 50 mg/dl. On 20 mg of atorvastatin her total chol is in the 130s w a LDL 70 and a HDL/total chol >30%.  Exercising 5 days a week and taking metformin since 6/2022. She has lost 15 lbs and finds her BP is better > SBPs have been 120 mm Hg or less.  She has waves of weakness usually in the morning. Feels fatigued at the same time.  Diagnosed w osteoporosis in the spine. She has reservations about taking Fosamax.      Review of patient's allergies indicates:   Allergen Reactions    Codeine Nausea And Vomiting        Current Outpatient Medications   Medication Sig    alendronate (FOSAMAX) 70 MG tablet Take 1 tablet (70 mg total) by mouth every 7 days.    amLODIPine (NORVASC) 2.5 MG tablet TAKE 1 TABLET BY MOUTH EVERY DAY    atorvastatin (LIPITOR) 20 MG tablet TAKE 1 TABLET DAILY    fluticasone (FLONASE) 50 mcg/actuation nasal spray instill 2 sprays into each nostril once daily    levothyroxine (SYNTHROID) 200 MCG tablet Take 1 tablet (200 mcg total) by mouth before breakfast.    losartan (COZAAR) 50 MG tablet TAKE 1 TABLET DAILY    metFORMIN (GLUCOPHAGE) 500 MG tablet Take 1 tablet (500 mg total) by mouth every evening.    omeprazole (PRILOSEC) 20 MG capsule Take 20 mg by mouth 2 (two) times daily as needed.     No current facility-administered medications for this visit.       Social history:  MaryLee B Harris  reports that she has never smoked. She has never used smokeless tobacco. She reports current alcohol use.      Objective:     /76   Pulse 76   Ht 5' 3" (1.6 m)   Wt 85.7 kg (188 lb 15 oz)   BMI 33.47 kg/m²    Physical Exam  Constitutional:       Appearance: She is well-developed.      " Comments:      HENT:      Head: Normocephalic.   Neck:      Vascular: No carotid bruit or JVD.   Cardiovascular:      Rate and Rhythm: Normal rate and regular rhythm.      Pulses:           Radial pulses are 2+ on the right side and 2+ on the left side.        Posterior tibial pulses are 2+ on the right side and 2+ on the left side.      Heart sounds: S1 normal and S2 normal. No murmur heard.    No gallop.   Pulmonary:      Effort: Pulmonary effort is normal.      Breath sounds: No wheezing or rales.   Chest:      Chest wall: There is no dullness to percussion.   Abdominal:      General: There is no abdominal bruit.      Palpations: Abdomen is soft. There is no hepatomegaly or splenomegaly.      Tenderness: There is no abdominal tenderness.   Musculoskeletal:      Right lower leg: No edema.      Left lower leg: No edema.   Skin:     General: Skin is warm and dry.      Findings: No bruising or rash.      Nails: There is no clubbing.   Neurological:      Mental Status: She is alert and oriented to person, place, and time.      Gait: Gait normal.   Psychiatric:         Speech: Speech normal.         Behavior: Behavior normal.         Judgment: Judgment normal.           Lab Results   Component Value Date    CHOL 133 11/21/2022    HDL 49 11/21/2022    LDLCALC 72.2 11/21/2022    TRIG 59 11/21/2022    CHOLHDL 36.8 11/21/2022     Lab Results   Component Value Date    GLU 94 11/21/2022    CREATININE 0.6 11/21/2022    BUN 14 11/21/2022     11/21/2022    K 3.8 11/21/2022     11/21/2022    CO2 26 11/21/2022     Lab Results   Component Value Date    ALT 14 11/21/2022    AST 12 11/21/2022    ALKPHOS 47 (L) 11/21/2022    BILITOT 0.3 11/21/2022       ECG sinus rhythm without ST-T abnormality      Assessment and Plan:       ICD-10-CM ICD-9-CM   1. Mixed hyperlipidemia  E78.2 272.2   2. Primary hypertension  I10 401.9   3. Coronary artery disease due to calcified coronary lesion  I25.10 414.00    I25.84 414.4   4. Hx of  thyroid cancer  Z85.850 V10.87   5. BMI 33.0-33.9,adult  Z68.33 V85.33        She does not report angina.  LDL is in the 70s.  Continue same dose of rosuvastatin.  Cholesterol Education.  Nutritional counseling.  Hypertension is very well controlled.  She has a prior diagnosis of hypertensive heart disease but on her last stress echo LV wall thickness was completely normal.    Weight loss of 15 lb through diet and exercise.       Orders placed during this encounter:     Mixed hyperlipidemia  -     Lipid Panel; Future; Expected date: 12/09/2023    Primary hypertension  -     Comprehensive Metabolic Panel; Future; Expected date: 12/09/2023    Coronary artery disease due to calcified coronary lesion  -     EKG 12-lead  -     EKG 12-lead; Future; Expected date: 12/08/2023    Hx of thyroid cancer    BMI 33.0-33.9,adult       Follow up in about 1 year (around 12/8/2023).

## 2022-12-08 NOTE — PATIENT INSTRUCTIONS
Cholesterol - total  LDL - bad type - lower is better.   HDL good type - higher is better  Your LDL should be less than 100 (the close to 70 is ideal).  Do not look at the reference range in [facility]'s labs. Those do not apply to you!    LDL - bad type - improves with diet and medications: typically statins; most other medications that lower LDL have not been proven to prevent heart attacks.  May not improve significantly with exercise alone.   Statins (cholesterol lowering meds) lower LDL and also reduce inflammation within blood vessels.    HDL - good type - improves with exercise.  Ideally greater than 50 mg/dl. The proportion of HDL to the total cholesterol is more important than the absolute HDL.  This means a HDL of 45 out of a total cholesterol of 130 mg'dl is pretty good, but the same HDL out of a total of  200 mg/dl is not quite as good. A level of 30% or higher is ideal.    TGs (triglycerides) - also bad - can change very quickly and considerably with certain foods. Improve with diet, exercise and high dose fish oil.  In some cases a low carbohydrate diet will lower TGs better than a low fat diet.  Ideal range  mg/dl.        Sugar, fat and cholesterol in food:     A sensible diet that limits the intake of sugars, saturated (bad) fats and trans fats while increasing the intake of unsaturated (good) fats and plant proteins is the basis of the current dietary recommendations.      We now recommend drastically reducing the intake of sugar and sugary drinks. There is less emphasis on excluding all fat and more emphasis on the types of different fats. We continue to recommend eating more vegetables.    Cholesterol in our food is generally present in relatively small amounts. New dietary guidelines are less obsessed with the amount of cholesterol. However please do not confuse this with the role of cholesterol in our blood and arteries. The liver converts certain foods into cholesterol.  It is this  "cholesterol and other fats that clog up our arteries.      Most foods that are high in cholesterol are also high in saturated fat. But there is much more saturated fat than cholesterol in these foods. The saturated fat content matters more than cholesterol. On the other hand, there are a handful of foods that are high in cholesterol but do not contain much saturated fat: eggs, shrimp, crab legs and crawfish are OK to eat in small quantities as long as you do not deep chaves them. So a few (2-3) eggs a week are fine (both the white and the yolk), but you can eat as many egg whites as you want. Also, some of these same foods irritate the inner lining of blood vessels by inducing inflammation (see below).  This occurs even if your blood cholesterol levels are "normal". So you should avoid foods that are high in saturated fat and sugar even if your blood cholesterol levels are normal.      Saturated fat is the bad fat - you should limit your intake of this. Deep fried foods, meats and other animal fats are high in saturated fat. Cookies, donuts and most dessert and cakes are usually high in both saturated fat and sugar.       Unsaturated fat is the good fat. It contains the same number of calories as saturated fat but these fats do not get deposited in our arteries. The Mediterranean style diet encourages the intake of unsaturated fat - olive oil, avocado and unsalted nuts. So instead of baking a piece of fish, consider pan-frying it using olive oil.     You should eat a few servings of vegetables (and fruit as long as you are not diabetic) everyday. Substitute some plant proteins in place of meat: beans, lentils, quinoa and oatmeal. They are lean proteins.    Vegetables (particularly green vegetables such as broccoli, kale and spinach) have anti-inflammatory properties. Some fruits (certain berries) have anti-oxidant properties. However I think foods that reduce vascular inflammation are much  more important than the " anti-oxidant effect of fruits and I predict that we will be prescribing anti inflammatory medication specifically for blood vessels to prevent heart attacks in the future. In the mean time eat more of the vegetables with anti-oxidant properties.     Do not use stick butter or stick margarine. Butter that comes in a tub is soft butter and consists of 1/2 butter and 1/2 vegetable oil (either canola or olive oil). It is preferable to use soft butter in small quantities. Avocado butter is also an option.      Trans fats should definitely be avoided. Most foods that are labelled as containing 0 gms of trans fat may still contain several hundred milligrams of trans fat: creamer, margarine, dough, deep fried foods, ready made frosting, potato, corn and torilla chips, cakes, cookies, pie crusts and crackers containing shortening made with hydrogenated vegetable oil.

## 2023-01-16 DIAGNOSIS — I10 ESSENTIAL HYPERTENSION: ICD-10-CM

## 2023-01-17 RX ORDER — AMLODIPINE BESYLATE 2.5 MG/1
TABLET ORAL
Qty: 90 TABLET | Refills: 3 | Status: SHIPPED | OUTPATIENT
Start: 2023-01-17 | End: 2024-01-22

## 2023-03-01 DIAGNOSIS — Z12.31 OTHER SCREENING MAMMOGRAM: ICD-10-CM

## 2023-03-06 ENCOUNTER — PATIENT MESSAGE (OUTPATIENT)
Dept: ADMINISTRATIVE | Facility: HOSPITAL | Age: 73
End: 2023-03-06
Payer: COMMERCIAL

## 2023-03-23 ENCOUNTER — HOSPITAL ENCOUNTER (OUTPATIENT)
Dept: RADIOLOGY | Facility: HOSPITAL | Age: 73
Discharge: HOME | End: 2023-03-23
Attending: FAMILY MEDICINE
Payer: COMMERCIAL

## 2023-03-23 DIAGNOSIS — Z12.31 OTHER SCREENING MAMMOGRAM: ICD-10-CM

## 2023-03-23 PROCEDURE — 77067 SCR MAMMO BI INCL CAD: CPT | Mod: 26,,, | Performed by: RADIOLOGY

## 2023-03-23 PROCEDURE — 77063 BREAST TOMOSYNTHESIS BI: CPT | Mod: 26,,, | Performed by: RADIOLOGY

## 2023-03-23 PROCEDURE — 77067 SCR MAMMO BI INCL CAD: CPT | Mod: TC,PN

## 2023-03-23 PROCEDURE — 77063 MAMMO DIGITAL SCREENING BILAT WITH TOMO: ICD-10-PCS | Mod: 26,,, | Performed by: RADIOLOGY

## 2023-03-23 PROCEDURE — 77067 MAMMO DIGITAL SCREENING BILAT WITH TOMO: ICD-10-PCS | Mod: 26,,, | Performed by: RADIOLOGY

## 2023-03-31 RX ORDER — ATORVASTATIN CALCIUM 20 MG/1
TABLET, FILM COATED ORAL
Qty: 90 TABLET | Refills: 3 | Status: SHIPPED | OUTPATIENT
Start: 2023-03-31 | End: 2024-03-25

## 2023-07-21 NOTE — PROGRESS NOTES
Patient scheduled to come in 8/15/2023   Subjective:      Patient ID: Nurys Do is a 67 y.o. female.    Chief Complaint: Hypertension    Here today for  BP recheck after starting Losartan 50, home BP readings highest systolic 134 at home.  she feels foggy, more tired midday.  She takes the medication at night.  She has seen cardiologist Dr. Ellsworth who has ordered dobutamine stress echocardiogram and coronary calcium scoring.  He will determine dosage of statin based on those results    Denies any chest pain, shortness of breath,       Current Outpatient Prescriptions on File Prior to Visit   Medication Sig    aspirin 81 MG Chew Take 1 tablet (81 mg total) by mouth once daily.    fluticasone (FLONASE) 50 mcg/actuation nasal spray instill 2 sprays into each nostril once daily (Patient taking differently: instill 2 sprays into each nostril as needed)    levothyroxine (SYNTHROID) 200 MCG tablet Take 1 tablet (200 mcg total) by mouth before breakfast.    losartan (COZAAR) 50 MG tablet Take 1 tablet (50 mg total) by mouth once daily.     No current facility-administered medications on file prior to visit.      Past Medical History:   Diagnosis Date    Diverticulosis     colonoscopy 2011, Dr Guerrier    [place] cardiac risk 10-20% in next 10 years 2/1/2018    HTN (hypertension), benign 2/1/2018    Hypertensive left ventricular hypertrophy, without heart failure 2/1/2018    Knee pain, left     eval in PT with pes planus, L foot, hip & knee pain, Dr Tirado takes Meloxicam 15 mg daily    Osteopenia 1/6/2016    Thyroid cancer     Dr Quinn     Past Surgical History:   Procedure Laterality Date    right foot surgery  2017    Dr Garcia    THYROIDECTOMY      x2, Dr Quinn     Social History     Social History Narrative    , 5 children, nonsmoker, ETOH socially, GYN here nml 2014, colonoscopy with diverticulosis 2011 Dr Guerrier     Family History   Problem Relation Age of Onset    Hypertension Mother     Dementia Mother     Colon  "cancer Father     Heart disease Father      Vitals:    03/01/18 0839   BP: 120/78   Pulse: 84   Temp: 98.4 °F (36.9 °C)   Weight: 90.6 kg (199 lb 11.8 oz)   Height: 5' 1.5" (1.562 m)   PainSc: 0-No pain     Objective:   Physical Exam   Cardiovascular: Normal rate, regular rhythm and normal heart sounds.    Pulmonary/Chest: Effort normal and breath sounds normal. No respiratory distress.   Psychiatric: She has a normal mood and affect. Her behavior is normal. Judgment and thought content normal.     Assessment:     1. Hypertensive left ventricular hypertrophy, without heart failure    2. [place] cardiac risk 10-20% in next 10 years    3. Hx of thyroid cancer      Plan:        Try decreasing losartan to 25 mg at night to see if this gives her more energy    Keep monitoring home blood pressure readings, goal systolic less than 130    Her cardiologist has upcoming tests    Follow-up with endocrinologist    Continue aspirin and statin may be started by cardiologist pending results of cardiac studies  Patient Instructions   ==============================================  FOR HIGH BLOOD PRESSURE (HYPERTENSION)-------------    Take your Blood pressure medication every day , BP stable    Try to stop these things that can elevate blood pressuere: salt, caffeine, energy drinks, diet pills, sudafed, alcohol , taking NSAIDS daily (advil, alleve, ibuprofen, naproxen) and birth control    DECREASE ALCOHOL CONSUMPTION    DECREASE SALT (fast foods, frozen, canned, processed foods, ham, turkey, fried foods, chips, crackers, etc) & drink 8 glasses of water a day with minimal caffeine ( 1 cup a day)   ==============================================    See me yearly with labs prior                              Answers for HPI/ROS submitted by the patient on 2/28/2018   Hypertension  Chronicity: new  Onset: 1 to 4 weeks ago  Progression since onset: gradually improving  Condition status: controlled  anxiety: No  blurred vision: " No  chest pain: No  headaches: No  malaise/fatigue: Yes  neck pain: No  orthopnea: No  palpitations: No  peripheral edema: No  PND: No  shortness of breath: Yes  sweats: No  Agents associated with hypertension: thyroid hormones  CAD risks: family history, obesity, post-menopausal state, sedentary lifestyle  Compliance problems: exercise  Past treatments: nothing  Improvement on treatment: mild

## 2023-09-06 ENCOUNTER — LAB VISIT (OUTPATIENT)
Dept: LAB | Facility: HOSPITAL | Age: 73
End: 2023-09-06
Payer: COMMERCIAL

## 2023-09-06 DIAGNOSIS — E89.0 POSTOPERATIVE HYPOTHYROIDISM: ICD-10-CM

## 2023-09-06 DIAGNOSIS — C73 MALIGNANT NEOPLASM OF THYROID GLAND: Primary | ICD-10-CM

## 2023-09-06 LAB
T3 SERPL-MCNC: 104 NG/DL (ref 60–180)
T4 FREE SERPL-MCNC: 1.59 NG/DL (ref 0.71–1.51)
TSH SERPL DL<=0.005 MIU/L-ACNC: <0.01 UIU/ML (ref 0.4–4)

## 2023-09-06 PROCEDURE — 36415 COLL VENOUS BLD VENIPUNCTURE: CPT | Mod: PN | Performed by: FAMILY MEDICINE

## 2023-09-06 PROCEDURE — 84439 ASSAY OF FREE THYROXINE: CPT | Performed by: FAMILY MEDICINE

## 2023-09-06 PROCEDURE — 84480 ASSAY TRIIODOTHYRONINE (T3): CPT | Performed by: FAMILY MEDICINE

## 2023-09-06 PROCEDURE — 84443 ASSAY THYROID STIM HORMONE: CPT | Performed by: FAMILY MEDICINE

## 2023-09-08 ENCOUNTER — TELEPHONE (OUTPATIENT)
Dept: PRIMARY CARE CLINIC | Facility: CLINIC | Age: 73
End: 2023-09-08
Payer: COMMERCIAL

## 2023-09-08 DIAGNOSIS — E78.2 MIXED HYPERLIPIDEMIA: Primary | ICD-10-CM

## 2023-09-08 NOTE — PROGRESS NOTES
Last seen 11/2022. Please offer follow up appt w me. All labs prior (she had thryoid labs drawn recently)

## 2023-09-08 NOTE — TELEPHONE ENCOUNTER
----- Message from Meeta Maciel MD sent at 9/8/2023  1:48 PM CDT -----  Last seen 11/2022. Please offer follow up appt w me. All labs prior (she had thryoid labs drawn recently)

## 2023-09-12 ENCOUNTER — PATIENT MESSAGE (OUTPATIENT)
Dept: PRIMARY CARE CLINIC | Facility: CLINIC | Age: 73
End: 2023-09-12
Payer: COMMERCIAL

## 2023-09-13 ENCOUNTER — PATIENT MESSAGE (OUTPATIENT)
Dept: PRIMARY CARE CLINIC | Facility: CLINIC | Age: 73
End: 2023-09-13
Payer: COMMERCIAL

## 2023-09-28 ENCOUNTER — PATIENT MESSAGE (OUTPATIENT)
Dept: PRIMARY CARE CLINIC | Facility: CLINIC | Age: 73
End: 2023-09-28
Payer: COMMERCIAL

## 2023-12-07 RX ORDER — METFORMIN HYDROCHLORIDE 500 MG/1
TABLET ORAL
Qty: 90 TABLET | Refills: 0 | Status: SHIPPED | OUTPATIENT
Start: 2023-12-07 | End: 2024-01-24 | Stop reason: SDUPTHER

## 2023-12-07 NOTE — TELEPHONE ENCOUNTER
Refill Routing Note   Medication(s) are not appropriate for processing by [facility] for the following reason(s):        Required labs outdated    [facility] action(s):  Defer   Requires appointment : Yes     Requires labs : Yes      Medication Therapy Plan: CMP on order 2/19/2024      Appointments  past 12m or future 3m with PCP    Date Provider   Last Visit   11/23/2022 Meeta Maciel MD   Next Visit   Visit date not found Meeta Maciel MD   ED visits in past 90 days: 0        Note composed:8:15 AM 12/07/2023

## 2023-12-07 NOTE — TELEPHONE ENCOUNTER
Care Due:                  Date            Visit Type   Department     Provider  --------------------------------------------------------------------------------                                ESTABLISHED                              PATIENT -    [facility] PRIMARY  Last Visit: 11-      [facility]      CARE           Meeta Maciel  Next Visit: None Scheduled  None         None Found                                                            Last  Test          Frequency    Reason                     Performed    Due Date  --------------------------------------------------------------------------------    Office Visit  15 months..  losartan, metFORMIN......  11- 02-    CMP.........  12 months..  losartan, metFORMIN......  11- 11-    HBA1C.......  6 months...  metFORMIN................  Not Found    Overdue    [employer] Embedded Care Due Messages. Reference number: [id number].   12/07/2023 8:09:18 AM CST

## 2024-01-03 DIAGNOSIS — I11.9 HYPERTENSIVE LEFT VENTRICULAR HYPERTROPHY, WITHOUT HEART FAILURE: ICD-10-CM

## 2024-01-04 NOTE — TELEPHONE ENCOUNTER
No care due was identified.  [facility] Embedded Care Due Messages. Reference number: [id number].   1/03/2024 11:46:02 PM CST

## 2024-01-04 NOTE — TELEPHONE ENCOUNTER
Refill Routing Note   Medication(s) are not appropriate for processing by [facility] for the following reason(s):        Required labs outdated  Required vitals outdated    [facility] action(s):  Defer        Medication Therapy Plan: FOVS iin 2 weeks      Appointments  past 12m or future 3m with PCP    Date Provider   Last Visit   11/23/2022 Meeta Maciel MD   Next Visit   1/24/2024 Meeta Maciel MD   ED visits in past 90 days: 0        Note composed:12:55 PM 01/04/2024

## 2024-01-06 ENCOUNTER — PATIENT MESSAGE (OUTPATIENT)
Dept: PRIMARY CARE CLINIC | Facility: CLINIC | Age: 74
End: 2024-01-06
Payer: COMMERCIAL

## 2024-01-08 RX ORDER — LOSARTAN POTASSIUM 50 MG/1
TABLET ORAL
Qty: 90 TABLET | Refills: 0 | Status: SHIPPED | OUTPATIENT
Start: 2024-01-08

## 2024-01-11 ENCOUNTER — IMMUNIZATION (OUTPATIENT)
Dept: INTERNAL MEDICINE | Facility: CLINIC | Age: 74
End: 2024-01-11
Payer: COMMERCIAL

## 2024-01-11 DIAGNOSIS — Z23 NEED FOR VACCINATION: Primary | ICD-10-CM

## 2024-01-11 PROCEDURE — 91322 SARSCOV2 VAC 50 MCG/0.5ML IM: CPT | Mod: S$GLB,,, | Performed by: INTERNAL MEDICINE

## 2024-01-11 PROCEDURE — 90480 ADMN SARSCOV2 VAC 1/ONLY CMP: CPT | Mod: S$GLB,,, | Performed by: INTERNAL MEDICINE

## 2024-01-20 DIAGNOSIS — I10 ESSENTIAL HYPERTENSION: ICD-10-CM

## 2024-01-22 ENCOUNTER — LAB VISIT (OUTPATIENT)
Dept: LAB | Facility: HOSPITAL | Age: 74
End: 2024-01-22
Attending: FAMILY MEDICINE
Payer: COMMERCIAL

## 2024-01-22 DIAGNOSIS — E78.2 MIXED HYPERLIPIDEMIA: ICD-10-CM

## 2024-01-22 DIAGNOSIS — M81.0 OSTEOPOROSIS, SENILE: ICD-10-CM

## 2024-01-22 LAB
25(OH)D3+25(OH)D2 SERPL-MCNC: 47 NG/ML (ref 30–96)
ALBUMIN SERPL BCP-MCNC: 3.8 G/DL (ref 3.5–5.2)
ALP SERPL-CCNC: 61 U/L (ref 55–135)
ALT SERPL W/O P-5'-P-CCNC: 14 U/L (ref 10–44)
ANION GAP SERPL CALC-SCNC: 6 MMOL/L (ref 8–16)
AST SERPL-CCNC: 13 U/L (ref 10–40)
BASOPHILS # BLD AUTO: 0.07 K/UL (ref 0–0.2)
BASOPHILS NFR BLD: 1.2 % (ref 0–1.9)
BILIRUB SERPL-MCNC: 0.4 MG/DL (ref 0.1–1)
BUN SERPL-MCNC: 11 MG/DL (ref 8–23)
CALCIUM SERPL-MCNC: 9.5 MG/DL (ref 8.7–10.5)
CHLORIDE SERPL-SCNC: 110 MMOL/L (ref 95–110)
CHOLEST SERPL-MCNC: 141 MG/DL (ref 120–199)
CHOLEST/HDLC SERPL: 2.9 {RATIO} (ref 2–5)
CO2 SERPL-SCNC: 27 MMOL/L (ref 23–29)
CREAT SERPL-MCNC: 0.7 MG/DL (ref 0.5–1.4)
DIFFERENTIAL METHOD BLD: ABNORMAL
EOSINOPHIL # BLD AUTO: 0.2 K/UL (ref 0–0.5)
EOSINOPHIL NFR BLD: 3.7 % (ref 0–8)
ERYTHROCYTE [DISTWIDTH] IN BLOOD BY AUTOMATED COUNT: 14.2 % (ref 11.5–14.5)
EST. GFR  (NO RACE VARIABLE): >60 ML/MIN/1.73 M^2
GLUCOSE SERPL-MCNC: 96 MG/DL (ref 70–110)
HCT VFR BLD AUTO: 37.2 % (ref 37–48.5)
HDLC SERPL-MCNC: 49 MG/DL (ref 40–75)
HDLC SERPL: 34.8 % (ref 20–50)
HGB BLD-MCNC: 12.1 G/DL (ref 12–16)
IMM GRANULOCYTES # BLD AUTO: 0.02 K/UL (ref 0–0.04)
IMM GRANULOCYTES NFR BLD AUTO: 0.3 % (ref 0–0.5)
LDLC SERPL CALC-MCNC: 77.6 MG/DL (ref 63–159)
LYMPHOCYTES # BLD AUTO: 2 K/UL (ref 1–4.8)
LYMPHOCYTES NFR BLD: 33.8 % (ref 18–48)
MCH RBC QN AUTO: 31.4 PG (ref 27–31)
MCHC RBC AUTO-ENTMCNC: 32.5 G/DL (ref 32–36)
MCV RBC AUTO: 97 FL (ref 82–98)
MONOCYTES # BLD AUTO: 0.5 K/UL (ref 0.3–1)
MONOCYTES NFR BLD: 8.4 % (ref 4–15)
NEUTROPHILS # BLD AUTO: 3.2 K/UL (ref 1.8–7.7)
NEUTROPHILS NFR BLD: 52.6 % (ref 38–73)
NONHDLC SERPL-MCNC: 92 MG/DL
NRBC BLD-RTO: 0 /100 WBC
PLATELET # BLD AUTO: 299 K/UL (ref 150–450)
PMV BLD AUTO: 10.5 FL (ref 9.2–12.9)
POTASSIUM SERPL-SCNC: 4.3 MMOL/L (ref 3.5–5.1)
PROT SERPL-MCNC: 7.3 G/DL (ref 6–8.4)
RBC # BLD AUTO: 3.85 M/UL (ref 4–5.4)
SODIUM SERPL-SCNC: 143 MMOL/L (ref 136–145)
T3 SERPL-MCNC: 104 NG/DL (ref 60–180)
T4 FREE SERPL-MCNC: 1.28 NG/DL (ref 0.71–1.51)
TRIGL SERPL-MCNC: 72 MG/DL (ref 30–150)
TSH SERPL DL<=0.005 MIU/L-ACNC: 0.01 UIU/ML (ref 0.4–4)
WBC # BLD AUTO: 5.98 K/UL (ref 3.9–12.7)

## 2024-01-22 PROCEDURE — 84443 ASSAY THYROID STIM HORMONE: CPT | Performed by: SPECIALIST

## 2024-01-22 PROCEDURE — 84480 ASSAY TRIIODOTHYRONINE (T3): CPT | Performed by: SPECIALIST

## 2024-01-22 PROCEDURE — 80053 COMPREHEN METABOLIC PANEL: CPT | Performed by: FAMILY MEDICINE

## 2024-01-22 PROCEDURE — 82306 VITAMIN D 25 HYDROXY: CPT | Performed by: FAMILY MEDICINE

## 2024-01-22 PROCEDURE — 85025 COMPLETE CBC W/AUTO DIFF WBC: CPT | Performed by: FAMILY MEDICINE

## 2024-01-22 PROCEDURE — 36415 COLL VENOUS BLD VENIPUNCTURE: CPT | Mod: PN | Performed by: FAMILY MEDICINE

## 2024-01-22 PROCEDURE — 84439 ASSAY OF FREE THYROXINE: CPT | Performed by: SPECIALIST

## 2024-01-22 PROCEDURE — 80061 LIPID PANEL: CPT | Performed by: FAMILY MEDICINE

## 2024-01-22 RX ORDER — AMLODIPINE BESYLATE 2.5 MG/1
TABLET ORAL
Qty: 90 TABLET | Refills: 3 | Status: SHIPPED | OUTPATIENT
Start: 2024-01-22

## 2024-01-24 ENCOUNTER — OFFICE VISIT (OUTPATIENT)
Dept: PRIMARY CARE CLINIC | Facility: CLINIC | Age: 74
End: 2024-01-24
Payer: COMMERCIAL

## 2024-01-24 VITALS
HEIGHT: 63 IN | HEART RATE: 71 BPM | OXYGEN SATURATION: 99 % | WEIGHT: 188.5 LBS | BODY MASS INDEX: 33.4 KG/M2 | SYSTOLIC BLOOD PRESSURE: 116 MMHG | DIASTOLIC BLOOD PRESSURE: 76 MMHG

## 2024-01-24 DIAGNOSIS — C73 PAPILLARY THYROID CARCINOMA: ICD-10-CM

## 2024-01-24 DIAGNOSIS — Z98.890 HISTORY OF ANKLE SURGERY: ICD-10-CM

## 2024-01-24 DIAGNOSIS — Z00.00 ROUTINE GENERAL MEDICAL EXAMINATION AT A HEALTH CARE FACILITY: Primary | ICD-10-CM

## 2024-01-24 DIAGNOSIS — E78.2 MIXED HYPERLIPIDEMIA: ICD-10-CM

## 2024-01-24 DIAGNOSIS — M81.0 OSTEOPOROSIS, SENILE: ICD-10-CM

## 2024-01-24 DIAGNOSIS — R63.5 WEIGHT GAIN: ICD-10-CM

## 2024-01-24 DIAGNOSIS — I11.9 HYPERTENSIVE LEFT VENTRICULAR HYPERTROPHY, WITHOUT HEART FAILURE: ICD-10-CM

## 2024-01-24 PROCEDURE — 1126F AMNT PAIN NOTED NONE PRSNT: CPT | Mod: CPTII,S$GLB,, | Performed by: FAMILY MEDICINE

## 2024-01-24 PROCEDURE — 3078F DIAST BP <80 MM HG: CPT | Mod: CPTII,S$GLB,, | Performed by: FAMILY MEDICINE

## 2024-01-24 PROCEDURE — 1101F PT FALLS ASSESS-DOCD LE1/YR: CPT | Mod: CPTII,S$GLB,, | Performed by: FAMILY MEDICINE

## 2024-01-24 PROCEDURE — 1159F MED LIST DOCD IN RCRD: CPT | Mod: CPTII,S$GLB,, | Performed by: FAMILY MEDICINE

## 2024-01-24 PROCEDURE — 99999 PR PBB SHADOW E&M-EST. PATIENT-LVL III: CPT | Mod: PBBFAC,,, | Performed by: FAMILY MEDICINE

## 2024-01-24 PROCEDURE — 99397 PER PM REEVAL EST PAT 65+ YR: CPT | Mod: S$GLB,,, | Performed by: FAMILY MEDICINE

## 2024-01-24 PROCEDURE — 1160F RVW MEDS BY RX/DR IN RCRD: CPT | Mod: CPTII,S$GLB,, | Performed by: FAMILY MEDICINE

## 2024-01-24 PROCEDURE — 3288F FALL RISK ASSESSMENT DOCD: CPT | Mod: CPTII,S$GLB,, | Performed by: FAMILY MEDICINE

## 2024-01-24 PROCEDURE — 3074F SYST BP LT 130 MM HG: CPT | Mod: CPTII,S$GLB,, | Performed by: FAMILY MEDICINE

## 2024-01-24 RX ORDER — LEVOTHYROXINE SODIUM 150 UG/1
150 TABLET ORAL
COMMUNITY
Start: 2024-01-10

## 2024-01-24 RX ORDER — METFORMIN HYDROCHLORIDE 500 MG/1
1000 TABLET ORAL DAILY
Qty: 180 TABLET | Refills: 2 | Status: SHIPPED | OUTPATIENT
Start: 2024-01-24

## 2024-01-24 NOTE — ASSESSMENT & PLAN NOTE
Slight wt loss w Metformin 500 daily. Increase to 1g daily.   avoid ozempic w pap thyr CA  Working daily 7000 steps/d, sitting w computer

## 2024-01-24 NOTE — ASSESSMENT & PLAN NOTE
Follow with GYN for female health & cancer prevention  Move more, High fiber, good fat (avocado, olive oil, nuts) foods  Eat more food grown from the earth (picked from trees or out of the ground)  Colon cancer screening at 44 yo  Follow up yearly with LABS ONE WEEK PRIOR so we can discuss at your visit

## 2024-01-24 NOTE — ASSESSMENT & PLAN NOTE
Stable, continue Atorvastatin 20 daily  Move more, sit less  High fiber, good fat (avocado, olive oil, nuts) foods  Try to eat 5 fresh COLORS a day

## 2024-01-24 NOTE — ASSESSMENT & PLAN NOTE
Stable (goal < 129/79)  Continue Losartan 50 daily& Amlodipine 2.5 mg daily  To reduce risk of  heart attack & stroke  Has appt w Cardiologist DR Ellsworth in Feb 2024

## 2024-01-24 NOTE — ASSESSMENT & PLAN NOTE
DAILY EXERCISE (walking, carrying light weights) & STRETCHING  is the BEST way to strengthen the bone where your muscles insert & prevent future fractures.   Intake 1200mg of  CALCIUM daily   Plus 800 units of VITAMIN D daily - 10 minutes of direct sunlight or a supplement .

## 2024-01-24 NOTE — PROGRESS NOTES
Assessment:     1. Routine general medical examination at a health care facility    2. Papillary thyroid carcinoma    3. Mixed hyperlipidemia    4. Hypertensive left ventricular hypertrophy, without heart failure    5. Osteoporosis, senile    6. Weight gain    7. History of ankle surgery      Plan:     Routine general medical examination at a health care facility  Follow with GYN for female health & cancer prevention  Move more, High fiber, good fat (avocado, olive oil, nuts) foods  Eat more food grown from the earth (picked from trees or out of the ground)  Colon cancer screening at 44 yo  Follow up yearly with LABS ONE WEEK PRIOR so we can discuss at your visit      Papillary thyroid carcinoma  Dr Quinn, stage 1, no mets  Continue meds, follow w ENT    Hypertensive left ventricular hypertrophy, without heart failure  Stable (goal < 129/79)  Continue Losartan 50 daily& Amlodipine 2.5 mg daily  To reduce risk of  heart attack & stroke  Has appt w Cardiologist DR Ellsworth in Feb 2024      Mixed hyperlipidemia  Stable, continue Atorvastatin 20 daily  Move more, sit less  High fiber, good fat (avocado, olive oil, nuts) foods  Try to eat 5 fresh COLORS a day      Osteoporosis, senile  DAILY EXERCISE (walking, carrying light weights) & STRETCHING  is the BEST way to strengthen the bone where your muscles insert & prevent future fractures.   Intake 1200mg of  CALCIUM daily   Plus 800 units of VITAMIN D daily - 10 minutes of direct sunlight or a supplement .       Weight gain  Slight wt loss w Metformin 500 daily. Increase to 1g daily.   avoid ozempic w pap thyr CA  Working daily 7000 steps/d, sitting w computer    History of ankle surgery  Bilateral ankle fusions w revisions, Dr Garcia  More pain on L , limping & affecting R hip pain  Recommended to restart hip exercises given in PT  Consider seeing Sp Med for injection if not better - let me know          CHIEF COMPLAINT: General exam    HPI: MaryLee B Harris is a  "73 y.o. female with is here today for general exam.     TSH 0.011 post resection thyroid cancer papillary. Followed by ENT DR Tiwari, taking meds below    Denies chest pain, shortness of breath    Current Outpatient Medications   Medication Instructions    amLODIPine (NORVASC) 2.5 MG tablet TAKE 1 TABLET BY MOUTH EVERY DAY    atorvastatin (LIPITOR) 20 MG tablet TAKE 1 TABLET DAILY    cholecalciferol, vitamin D3, (VITAMIN D3 ORAL) 1 tablet, Oral, Daily    fluticasone (FLONASE) 50 mcg/actuation nasal spray instill 2 sprays into each nostril once daily    levothyroxine (SYNTHROID) 150 mcg, Oral    losartan (COZAAR) 50 MG tablet TAKE 1 TABLET DAILY    metFORMIN (GLUCOPHAGE) 1,000 mg, Oral, Daily    omeprazole (PRILOSEC) 20 mg, Oral, 2 times daily PRN       Lab Results   Component Value Date    HGBA1C 5.2 02/06/2019     No results found for: "MICALBCREAT"  Lab Results   Component Value Date    LDLCALC 77.6 01/22/2024    LDLCALC 72.2 11/21/2022    CHOL 141 01/22/2024    HDL 49 01/22/2024    TRIG 72 01/22/2024       Lab Results   Component Value Date     01/22/2024    K 4.3 01/22/2024     01/22/2024    CO2 27 01/22/2024    GLU 96 01/22/2024    BUN 11 01/22/2024    CREATININE 0.7 01/22/2024    CALCIUM 9.5 01/22/2024    PROT 7.3 01/22/2024    ALBUMIN 3.8 01/22/2024    BILITOT 0.4 01/22/2024    ALKPHOS 61 01/22/2024    AST 13 01/22/2024    ALT 14 01/22/2024    ANIONGAP 6 (L) 01/22/2024    ESTGFRAFRICA >60.0 06/16/2022    EGFRNONAA >60.0 06/16/2022    WBC 5.98 01/22/2024    HGB 12.1 01/22/2024    HGB 12.1 06/16/2022    HCT 37.2 01/22/2024    MCV 97 01/22/2024     01/22/2024    TSH 0.011 (L) 01/22/2024    HEPCAB Negative 08/23/2016       Lab Results   Component Value Date    QIYNAFMP11GC 47 01/22/2024    IEBCHLIY76SI 33 11/18/2013         Past Medical History:   Diagnosis Date    Arthritis 06/23/2022    Diverticulosis     colonoscopy 2011, Dr Guerrier    GERD (gastroesophageal reflux disease) 10/22/2020    " "gastritis EGD 2/2021 Dr Guerrier    History of ankle surgery 06/23/2022    Bilateral ankle fusions, Dr Garcia    HTN (hypertension), benign 02/01/2018    Hypertensive left ventricular hypertrophy, without heart failure 02/01/2018    Dr Ellsworth    Knee pain, left     eval in PT with pes planus, L foot, hip & knee pain, Dr Tirado takes Meloxicam 15 mg daily    Mixed hyperlipidemia 02/15/2018    Osteoporosis, senile 2022    Papillary thyroid carcinoma     Dr Quinn, stage 1, no mets    Postoperative hypothyroidism 10/22/2020    Scoliosis     noted at PT    Weight gain 06/23/2022    Try metf, avoid ozempic w pap thyr CA     Past Surgical History:   Procedure Laterality Date    right foot surgery  2017    Dr Garcia    THYROIDECTOMY      x2, Dr Quinn     Vitals:    01/24/24 0810   BP: 116/76   Pulse: 71   SpO2: 99%   Weight: 85.5 kg (188 lb 7.9 oz)   Height: 5' 3" (1.6 m)   PainSc: 0-No pain     Wt Readings from Last 5 Encounters:   01/24/24 85.5 kg (188 lb 7.9 oz)   12/08/22 85.7 kg (188 lb 15 oz)   06/23/22 90.3 kg (199 lb 1.2 oz)   01/27/22 86.2 kg (190 lb)   02/12/21 86.6 kg (191 lb)     Objective:   Physical Exam  Constitutional:       Appearance: She is well-developed.   Eyes:      Pupils: Pupils are equal, round, and reactive to light.   Cardiovascular:      Rate and Rhythm: Normal rate and regular rhythm.      Heart sounds: Normal heart sounds. No murmur heard.  Pulmonary:      Effort: Pulmonary effort is normal.      Breath sounds: Normal breath sounds. No wheezing.   Abdominal:      General: Bowel sounds are normal. There is no distension.      Palpations: Abdomen is soft. There is no mass.      Tenderness: There is no abdominal tenderness. There is no guarding or rebound.   Musculoskeletal:      Cervical back: Neck supple.   Skin:     General: Skin is warm and dry.   Neurological:      Mental Status: She is alert.   Psychiatric:         Behavior: Behavior normal.                                     "

## 2024-01-24 NOTE — ASSESSMENT & PLAN NOTE
Bilateral ankle fusions w revisions, Dr Garcia  More pain on L , limping & affecting R hip pain  Recommended to restart hip exercises given in PT  Consider seeing Sp Med for injection if not better - let me know

## 2024-02-19 ENCOUNTER — LAB VISIT (OUTPATIENT)
Dept: LAB | Facility: HOSPITAL | Age: 74
End: 2024-02-19
Payer: COMMERCIAL

## 2024-02-19 DIAGNOSIS — E78.2 MIXED HYPERLIPIDEMIA: ICD-10-CM

## 2024-02-19 DIAGNOSIS — I10 PRIMARY HYPERTENSION: ICD-10-CM

## 2024-02-19 LAB
ALBUMIN SERPL BCP-MCNC: 3.8 G/DL (ref 3.5–5.2)
ALP SERPL-CCNC: 53 U/L (ref 55–135)
ALT SERPL W/O P-5'-P-CCNC: 14 U/L (ref 10–44)
ANION GAP SERPL CALC-SCNC: 8 MMOL/L (ref 8–16)
AST SERPL-CCNC: 13 U/L (ref 10–40)
BILIRUB SERPL-MCNC: 0.4 MG/DL (ref 0.1–1)
BUN SERPL-MCNC: 9 MG/DL (ref 8–23)
CALCIUM SERPL-MCNC: 9.5 MG/DL (ref 8.7–10.5)
CHLORIDE SERPL-SCNC: 108 MMOL/L (ref 95–110)
CHOLEST SERPL-MCNC: 135 MG/DL (ref 120–199)
CHOLEST/HDLC SERPL: 2.8 {RATIO} (ref 2–5)
CO2 SERPL-SCNC: 26 MMOL/L (ref 23–29)
CREAT SERPL-MCNC: 0.6 MG/DL (ref 0.5–1.4)
EST. GFR  (NO RACE VARIABLE): >60 ML/MIN/1.73 M^2
GLUCOSE SERPL-MCNC: 95 MG/DL (ref 70–110)
HDLC SERPL-MCNC: 48 MG/DL (ref 40–75)
HDLC SERPL: 35.6 % (ref 20–50)
LDLC SERPL CALC-MCNC: 71.4 MG/DL (ref 63–159)
NONHDLC SERPL-MCNC: 87 MG/DL
POTASSIUM SERPL-SCNC: 3.9 MMOL/L (ref 3.5–5.1)
PROT SERPL-MCNC: 7.1 G/DL (ref 6–8.4)
SODIUM SERPL-SCNC: 142 MMOL/L (ref 136–145)
TRIGL SERPL-MCNC: 78 MG/DL (ref 30–150)

## 2024-02-19 PROCEDURE — 80061 LIPID PANEL: CPT | Performed by: INTERNAL MEDICINE

## 2024-02-19 PROCEDURE — 80053 COMPREHEN METABOLIC PANEL: CPT | Performed by: INTERNAL MEDICINE

## 2024-02-19 PROCEDURE — 36415 COLL VENOUS BLD VENIPUNCTURE: CPT | Mod: PN | Performed by: INTERNAL MEDICINE

## 2024-02-24 PROBLEM — R93.1 AGATSTON CORONARY ARTERY CALCIUM SCORE BETWEEN 200 AND 399: Status: ACTIVE | Noted: 2024-02-24

## 2024-02-24 PROBLEM — R93.1 AGATSTON CORONARY ARTERY CALCIUM SCORE BETWEEN 200 AND 399: Status: RESOLVED | Noted: 2024-02-24 | Resolved: 2024-02-24

## 2024-02-24 NOTE — PROGRESS NOTES
General Cardiology Clinic Note  Last Clinic Visit: 12/8/22 with Dr. Ellsworth   General Cardiologist: Dr. Ellsworth    HPI:     MaryLee B Harris is a 73 y.o. , who presents for HTN, HLD follow-up.    PROBLEM LIST:  HTN  HLD  CACS ~200 (2/2018)  Thyroid cancer s/p thyroidectomy  Insulin resistance    Interval HPI:   She presents today for routine follow-up. Only complaint is feeling more tired since her synthroid was reduced recently. Doing overall well from a cardiovascular standpoint. Denies chest pain/pressure/tightness/discomfort, TURNER, palpitations, PND/orthopnea, edema, lightheadedness or syncope, TIA/stroke symptoms, claudication, or changes in exercise capacity. Weight stable. No designated exercise (limited by arthritis) but she is a generally active person, still works without limitations - often on her feet for several hours. BP mildly elevated in clinic today but has been eating out more recently. Checks at home occasionally with readings 110s-120s/70s. Last FLP done on 2/19/24: LDL - 71.4. Kidney function is stable, last Cr 0.6.       12/2022 HPI (Dr. Ellsworth)  MaryLee B Harris is a 72 y.o. female who presents for follow-up of Hyperlipidemia and Hypertension.  Untreated total chol was 200 w a HDL of 50 mg/dl. On 20 mg of atorvastatin her total chol is in the 130s w a LDL 70 and a HDL/total chol >30%.  Exercising 5 days a week and taking metformin since 6/2022. She has lost 15 lbs and finds her BP is better - SBPs have been 120 mm Hg or less.  She has waves of weakness usually in the morning. Feels fatigued at the same time.  Diagnosed w osteoporosis in the spine. She has reservations about taking Fosamax.    1/2021 HPI (Dr. Ellsworth)  She reports SBPs that are in the 130s and 140s w DBPs in the 80s.   Less physically active lately. Has arthritis in both ankles.  Untreated total chol was 200 w a HDL of 50 mg/dl. On 20 mg of atorvastatin her total chol is 121 w a LDL(c) <70 and a HDL/total chol of 40%.  Hepatic transaminases are within normal limits.     Surgical: Reviewed, as below.  Family: Reviewed, as below. No premature CAD, HF, SCD.  Social: Reviewed, as below.    ROS:    Pertinent ROS included in HPI and below.  PMH:     Past Medical History:   Diagnosis Date    Arthritis 06/23/2022    Diverticulosis     colonoscopy 2011, Dr Guerrier    GERD (gastroesophageal reflux disease) 10/22/2020    gastritis EGD 2/2021 Dr Guerrier    History of ankle surgery 06/23/2022    Bilateral ankle fusions, Dr Garcia    HTN (hypertension), benign 02/01/2018    Hypertensive left ventricular hypertrophy, without heart failure 02/01/2018    Dr Ellsworth    Knee pain, left     eval in PT with pes planus, L foot, hip & knee pain, Dr Tirado takes Meloxicam 15 mg daily    Mixed hyperlipidemia 02/15/2018    Osteoporosis, senile 2022    Papillary thyroid carcinoma     Dr Quinn, stage 1, no mets    Postoperative hypothyroidism 10/22/2020    Scoliosis     noted at PT    Weight gain 06/23/2022    Try metf, avoid ozempic w pap thyr CA     Past Surgical History:   Procedure Laterality Date    right foot surgery  2017    Dr Garcia    THYROIDECTOMY      x2, Dr Quinn     Allergies:     Review of patient's allergies indicates:   Allergen Reactions    Codeine Nausea And Vomiting     Medications:     Current Outpatient Medications on File Prior to Visit   Medication Sig Dispense Refill    amLODIPine (NORVASC) 2.5 MG tablet TAKE 1 TABLET BY MOUTH EVERY DAY 90 tablet 3    atorvastatin (LIPITOR) 20 MG tablet TAKE 1 TABLET DAILY 90 tablet 3    cholecalciferol, vitamin D3, (VITAMIN D3 ORAL) Take 1 tablet by mouth once daily.      fluticasone (FLONASE) 50 mcg/actuation nasal spray instill 2 sprays into each nostril once daily (Patient taking differently: 1 spray by Each Nostril route as needed.) 16 g 11    levothyroxine (SYNTHROID) 150 MCG tablet Take 150 mcg by mouth before breakfast.      losartan (COZAAR) 50 MG tablet TAKE 1 TABLET DAILY 90 tablet 0     "metFORMIN (GLUCOPHAGE) 500 MG tablet Take 2 tablets (1,000 mg total) by mouth once daily. 180 tablet 2    omeprazole (PRILOSEC) 20 MG capsule Take 20 mg by mouth 2 (two) times daily as needed.       No current facility-administered medications on file prior to visit.     Social History:     Social History     Tobacco Use    Smoking status: Never    Smokeless tobacco: Never   Substance Use Topics    Alcohol use: Yes     Family History:     Family History   Problem Relation Age of Onset    Hypertension Mother     Dementia Mother     Colon cancer Father     Heart disease Father      Physical Exam:   BP (!) 132/90   Pulse 80   Ht 5' 2" (1.575 m)   Wt 86.3 kg (190 lb 4.1 oz)   BMI 34.80 kg/m²      Physical Exam  Constitutional:       Appearance: Normal appearance.   Neck:      Vascular: No carotid bruit.   Cardiovascular:      Rate and Rhythm: Normal rate and regular rhythm.      Pulses:           Carotid pulses are 2+ on the right side and 2+ on the left side.       Radial pulses are 2+ on the right side and 2+ on the left side.      Heart sounds: Normal heart sounds.   Pulmonary:      Effort: Pulmonary effort is normal.      Breath sounds: Normal breath sounds.   Musculoskeletal:      Right lower leg: No edema.      Left lower leg: No edema.   Skin:     General: Skin is warm and dry.   Neurological:      Mental Status: She is alert.          Labs:     Blood Tests:  Lab Results   Component Value Date     02/19/2024    K 3.9 02/19/2024     02/19/2024    CO2 26 02/19/2024    BUN 9 02/19/2024    CREATININE 0.6 02/19/2024    GLU 95 02/19/2024    HGBA1C 5.2 02/06/2019    AST 13 02/19/2024    ALT 14 02/19/2024    ALBUMIN 3.8 02/19/2024    PROT 7.1 02/19/2024    BILITOT 0.4 02/19/2024    WBC 5.98 01/22/2024    HGB 12.1 01/22/2024    HCT 37.2 01/22/2024    MCV 97 01/22/2024     01/22/2024    TSH 0.011 (L) 01/22/2024       Lab Results   Component Value Date    CHOL 135 02/19/2024    HDL 48 02/19/2024    " TRIG 78 02/19/2024       Lab Results   Component Value Date    LDLCALC 71.4 02/19/2024       Lab Results   Component Value Date    TSH 0.011 (L) 01/22/2024       Lab Results   Component Value Date    HGBA1C 5.2 02/06/2019         Imaging:     Echocardiogram  None    Stress testing  DSE 2/2021  The patient reached the end of the protocol.  There were no arrhythmias during stress.  The ECG portion of this study is negative for myocardial ischemia.  The left ventricle is normal in size with concentric remodeling and normal systolic function. The estimated ejection fraction is 63%  Normal left ventricular diastolic function.  Normal right ventricular size with normal right ventricular systolic function.  Mild right atrial enlargement.  Normal central venous pressure (3 mmHg).  The estimated PA systolic pressure is 24 mmHg.  The stress echo portion of this study is negative for myocardial ischemia.    DSE 3/2018  CONCLUSIONS     1 - Normal left ventricular systolic function (EF 55-60%).     2 - Indeterminate LV diastolic function.     3 - Normal right ventricular systolic function .     4 - The estimated PA systolic pressure is 30 mmHg.     5 - Trivial mitral regurgitation.     6 - Trivial tricuspid regurgitation.     7 - Intermediate central venous pressure.     8 - Mild left atrial enlargement.     9 - No wall motion abnormalities.     Cath Lab  None    Other  CT Calcium Scoring 3/2018  Agatston Score:  Range of 174-250 as explained below.  Modification of left circumflex artery calcification difficult secondary to motion artifact.  Including possible motion artifact, patient's scores 250 which is likely an overestimate.  Completely excluding area of possible calcification/motion artifact, patient scores 174 which is likely an underestimate.  Both scores fall within the same percentile category and share identical score interpretations. Moderate nonobstructive coronary artery disease is highly likely. When calculated  for age and gender, patient is between the 75-90th percentile for cardiovascular risk.     EK24 - NSR, 71 bpm     Assessment:     1. Primary hypertension    2. Mixed hyperlipidemia    3. Agatston coronary artery calcium score between 100 and 400    4. Coronary artery disease due to calcified coronary lesion        Plan:     Primary hypertension  BP well-controlled. Continue current medication regimen.    Mixed hyperlipidemia  Lipids at goal. Continue statin therapy.    Agatston coronary artery calcium score between 100 and 400  Patient denies any anginal symptoms. Encouraged continued exercise and Mediterranean diet.   Continue statin therapy.       Patient was seen and discussed in conjunction with Dr. Ellsworth.    Signed:  Lauren Vlosich, PA-C [facility]     Follow-up:     No future appointments.

## 2024-02-26 ENCOUNTER — OFFICE VISIT (OUTPATIENT)
Dept: CARDIOLOGY | Facility: CLINIC | Age: 74
End: 2024-02-26
Payer: COMMERCIAL

## 2024-02-26 VITALS
DIASTOLIC BLOOD PRESSURE: 90 MMHG | BODY MASS INDEX: 35.01 KG/M2 | HEIGHT: 62 IN | SYSTOLIC BLOOD PRESSURE: 132 MMHG | WEIGHT: 190.25 LBS | HEART RATE: 80 BPM

## 2024-02-26 DIAGNOSIS — R93.1 AGATSTON CORONARY ARTERY CALCIUM SCORE BETWEEN 100 AND 400: ICD-10-CM

## 2024-02-26 DIAGNOSIS — E78.2 MIXED HYPERLIPIDEMIA: ICD-10-CM

## 2024-02-26 DIAGNOSIS — I10 PRIMARY HYPERTENSION: Primary | ICD-10-CM

## 2024-02-26 DIAGNOSIS — I25.10 CORONARY ARTERY DISEASE DUE TO CALCIFIED CORONARY LESION: ICD-10-CM

## 2024-02-26 DIAGNOSIS — I25.84 CORONARY ARTERY DISEASE DUE TO CALCIFIED CORONARY LESION: ICD-10-CM

## 2024-02-26 LAB
OHS QRS DURATION: 86 MS
OHS QTC CALCULATION: 428 MS

## 2024-02-26 PROCEDURE — 99999 PR PBB SHADOW E&M-EST. PATIENT-LVL III: CPT | Mod: PBBFAC,,, | Performed by: INTERNAL MEDICINE

## 2024-02-26 PROCEDURE — 1159F MED LIST DOCD IN RCRD: CPT | Mod: CPTII,S$GLB,, | Performed by: INTERNAL MEDICINE

## 2024-02-26 PROCEDURE — 3080F DIAST BP >= 90 MM HG: CPT | Mod: CPTII,S$GLB,, | Performed by: INTERNAL MEDICINE

## 2024-02-26 PROCEDURE — 1101F PT FALLS ASSESS-DOCD LE1/YR: CPT | Mod: CPTII,S$GLB,, | Performed by: INTERNAL MEDICINE

## 2024-02-26 PROCEDURE — 3075F SYST BP GE 130 - 139MM HG: CPT | Mod: CPTII,S$GLB,, | Performed by: INTERNAL MEDICINE

## 2024-02-26 PROCEDURE — 1126F AMNT PAIN NOTED NONE PRSNT: CPT | Mod: CPTII,S$GLB,, | Performed by: INTERNAL MEDICINE

## 2024-02-26 PROCEDURE — 93010 ELECTROCARDIOGRAM REPORT: CPT | Mod: S$GLB,,, | Performed by: INTERNAL MEDICINE

## 2024-02-26 PROCEDURE — 93005 ELECTROCARDIOGRAM TRACING: CPT | Mod: S$GLB,,, | Performed by: INTERNAL MEDICINE

## 2024-02-26 PROCEDURE — 99214 OFFICE O/P EST MOD 30 MIN: CPT | Mod: S$GLB,,, | Performed by: INTERNAL MEDICINE

## 2024-02-26 PROCEDURE — 3288F FALL RISK ASSESSMENT DOCD: CPT | Mod: CPTII,S$GLB,, | Performed by: INTERNAL MEDICINE

## 2024-02-26 NOTE — PATIENT INSTRUCTIONS
Following a heart healthy diet such as the Mediterranean Diet is recommended in addition to 150 minutes a week (30 minutes per day, 5 days per week) of moderate intensity exercise to lower cholesterol, maintain a healthy body weight, and improve overall cardiovascular health.

## 2024-03-25 RX ORDER — ATORVASTATIN CALCIUM 20 MG/1
TABLET, FILM COATED ORAL
Qty: 90 TABLET | Refills: 3 | Status: SHIPPED | OUTPATIENT
Start: 2024-03-25

## 2024-04-08 DIAGNOSIS — I11.9 HYPERTENSIVE LEFT VENTRICULAR HYPERTROPHY, WITHOUT HEART FAILURE: ICD-10-CM

## 2024-04-08 NOTE — TELEPHONE ENCOUNTER
Care Due:                  Date            Visit Type   Department     Provider  --------------------------------------------------------------------------------                                EP -                              PRIMARY      [facility] PRIMARY  Last Visit: 01-      CARE ([facility])   SAMEER Maciel  Next Visit: None Scheduled  None         None Found                                                            Last  Test          Frequency    Reason                     Performed    Due Date  --------------------------------------------------------------------------------    HBA1C.......  6 months...  metFORMIN................  Not Found    Overdue    [employer] Embedded Care Due Messages. Reference number: [id number].   4/08/2024 12:07:30 AM CDT

## 2024-04-08 NOTE — TELEPHONE ENCOUNTER
Refill Routing Note   Medication(s) are not appropriate for processing by [facility] for the following reason(s):        Required vitals abnormal    [facility] action(s):  Defer   Requires labs : Yes             Appointments  past 12m or future 3m with PCP    Date Provider   Last Visit   1/24/2024 Meeta Maciel MD   Next Visit   Visit date not found Meeta Maciel MD   ED visits in past 90 days: 0        Note composed:7:48 AM 04/08/2024

## 2024-04-09 RX ORDER — LOSARTAN POTASSIUM 50 MG/1
TABLET ORAL
Qty: 90 TABLET | Refills: 1 | Status: SHIPPED | OUTPATIENT
Start: 2024-04-09

## 2024-04-15 ENCOUNTER — PATIENT OUTREACH (OUTPATIENT)
Dept: ADMINISTRATIVE | Facility: HOSPITAL | Age: 74
End: 2024-04-15
Payer: COMMERCIAL

## 2024-04-15 NOTE — PROGRESS NOTES
Patient due for the following    Aspirin/Antiplatelet Therapy     Shingles Vaccine (2 of 2)      Called patient to follow up blood pressure.  LVM     Immunizations: reviewed and updated  Care Everywhere: triggered  Care Teams: up to date  Outreach: [facility]

## 2024-04-29 PROBLEM — Z00.00 ROUTINE GENERAL MEDICAL EXAMINATION AT A HEALTH CARE FACILITY: Status: RESOLVED | Noted: 2024-01-24 | Resolved: 2024-04-29

## 2024-06-07 ENCOUNTER — PATIENT MESSAGE (OUTPATIENT)
Dept: PRIMARY CARE CLINIC | Facility: CLINIC | Age: 74
End: 2024-06-07
Payer: COMMERCIAL

## 2024-10-04 ENCOUNTER — TELEPHONE (OUTPATIENT)
Dept: PRIMARY CARE CLINIC | Facility: CLINIC | Age: 74
End: 2024-10-04
Payer: COMMERCIAL

## 2024-10-04 DIAGNOSIS — I11.9 HYPERTENSIVE LEFT VENTRICULAR HYPERTROPHY, WITHOUT HEART FAILURE: ICD-10-CM

## 2024-10-04 DIAGNOSIS — E78.2 MIXED HYPERLIPIDEMIA: Primary | ICD-10-CM

## 2024-10-04 RX ORDER — LOSARTAN POTASSIUM 50 MG/1
TABLET ORAL
Qty: 90 TABLET | Refills: 0 | Status: SHIPPED | OUTPATIENT
Start: 2024-10-04

## 2024-10-04 NOTE — TELEPHONE ENCOUNTER
Refill Routing Note   Medication(s) are not appropriate for processing by [facility] for the following reason(s):        Required vitals abnormal    [facility] action(s):  Defer     Requires labs : Yes             Appointments  past 12m or future 3m with PCP    Date Provider   Last Visit   Visit date not found Meeta Maciel MD   Next Visit   Visit date not found Meeta Maciel MD   ED visits in past 90 days: 0        Note composed:12:56 AM 10/04/2024

## 2024-10-04 NOTE — TELEPHONE ENCOUNTER
I refilled medicine   Please offer appointment after 1/24  with labs  & TSH prior  Thanks!   No change

## 2024-10-04 NOTE — TELEPHONE ENCOUNTER
Care Due:                  Date            Visit Type   Department     Provider  --------------------------------------------------------------------------------                                EP -                              PRIMARY      [facility] PRIMARY  Last Visit: 01-      CARE ([facility])   SAMEER Maciel  Next Visit: None Scheduled  None         None Found                                                            Last  Test          Frequency    Reason                     Performed    Due Date  --------------------------------------------------------------------------------    HBA1C.......  6 months...  metFORMIN................  Not Found    Overdue    [employer] Embedded Care Due Messages. Reference number: [id number].   10/04/2024 12:15:56 AM CDT

## 2024-10-08 ENCOUNTER — IMMUNIZATION (OUTPATIENT)
Dept: INTERNAL MEDICINE | Facility: CLINIC | Age: 74
End: 2024-10-08
Payer: COMMERCIAL

## 2024-10-08 DIAGNOSIS — Z23 NEED FOR VACCINATION: Primary | ICD-10-CM

## 2024-10-08 PROCEDURE — 90656 IIV3 VACC NO PRSV 0.5 ML IM: CPT | Mod: S$GLB,,, | Performed by: INTERNAL MEDICINE

## 2024-10-08 PROCEDURE — 91320 SARSCV2 VAC 30MCG TRS-SUC IM: CPT | Mod: S$GLB,,, | Performed by: INTERNAL MEDICINE

## 2024-10-08 PROCEDURE — 90480 ADMN SARSCOV2 VAC 1/ONLY CMP: CPT | Mod: S$GLB,,, | Performed by: INTERNAL MEDICINE

## 2024-10-08 PROCEDURE — 90471 IMMUNIZATION ADMIN: CPT | Mod: S$GLB,,, | Performed by: INTERNAL MEDICINE

## 2024-10-09 ENCOUNTER — HOSPITAL ENCOUNTER (OUTPATIENT)
Dept: RADIOLOGY | Facility: HOSPITAL | Age: 74
Discharge: HOME | End: 2024-10-09
Attending: FAMILY MEDICINE
Payer: COMMERCIAL

## 2024-10-09 VITALS — BODY MASS INDEX: 34.96 KG/M2 | HEIGHT: 62 IN | WEIGHT: 190 LBS

## 2024-10-09 DIAGNOSIS — Z12.31 ENCOUNTER FOR SCREENING MAMMOGRAM FOR BREAST CANCER: ICD-10-CM

## 2024-10-09 PROCEDURE — 77067 SCR MAMMO BI INCL CAD: CPT | Mod: 26,,, | Performed by: RADIOLOGY

## 2024-10-09 PROCEDURE — 77063 BREAST TOMOSYNTHESIS BI: CPT | Mod: TC,PO

## 2024-10-09 PROCEDURE — 77063 BREAST TOMOSYNTHESIS BI: CPT | Mod: 26,,, | Performed by: RADIOLOGY

## 2024-10-09 PROCEDURE — 77067 SCR MAMMO BI INCL CAD: CPT | Mod: TC,PO

## 2024-10-29 RX ORDER — METFORMIN HYDROCHLORIDE 500 MG/1
1000 TABLET ORAL
Qty: 180 TABLET | Refills: 0 | Status: SHIPPED | OUTPATIENT
Start: 2024-10-29

## 2024-10-31 ENCOUNTER — PATIENT MESSAGE (OUTPATIENT)
Dept: PRIMARY CARE CLINIC | Facility: CLINIC | Age: 74
End: 2024-10-31
Payer: COMMERCIAL

## 2024-11-01 ENCOUNTER — OFFICE VISIT (OUTPATIENT)
Dept: PRIMARY CARE CLINIC | Facility: CLINIC | Age: 74
End: 2024-11-01
Payer: COMMERCIAL

## 2024-11-01 DIAGNOSIS — E66.811 CLASS 1 OBESITY DUE TO EXCESS CALORIES WITH SERIOUS COMORBIDITY AND BODY MASS INDEX (BMI) OF 34.0 TO 34.9 IN ADULT: ICD-10-CM

## 2024-11-01 DIAGNOSIS — K57.92 DIVERTICULITIS: Primary | ICD-10-CM

## 2024-11-01 DIAGNOSIS — E66.09 CLASS 1 OBESITY DUE TO EXCESS CALORIES WITH SERIOUS COMORBIDITY AND BODY MASS INDEX (BMI) OF 34.0 TO 34.9 IN ADULT: ICD-10-CM

## 2024-11-01 PROCEDURE — 1160F RVW MEDS BY RX/DR IN RCRD: CPT | Mod: CPTII,95,, | Performed by: STUDENT IN AN ORGANIZED HEALTH CARE EDUCATION/TRAINING PROGRAM

## 2024-11-01 PROCEDURE — 99214 OFFICE O/P EST MOD 30 MIN: CPT | Mod: 95,,, | Performed by: STUDENT IN AN ORGANIZED HEALTH CARE EDUCATION/TRAINING PROGRAM

## 2024-11-01 PROCEDURE — 1159F MED LIST DOCD IN RCRD: CPT | Mod: CPTII,95,, | Performed by: STUDENT IN AN ORGANIZED HEALTH CARE EDUCATION/TRAINING PROGRAM

## 2024-11-01 RX ORDER — CIPROFLOXACIN 500 MG/1
500 TABLET ORAL 2 TIMES DAILY
Qty: 20 TABLET | Refills: 0 | Status: CANCELLED | OUTPATIENT
Start: 2024-11-01 | End: 2024-11-11

## 2024-11-01 RX ORDER — CALCIUM CARB/VITAMIN D3/VIT K1 650MG-12.5
TABLET,CHEWABLE ORAL
COMMUNITY
Start: 2024-08-01

## 2024-11-01 RX ORDER — METRONIDAZOLE 500 MG/1
500 TABLET ORAL 3 TIMES DAILY
Qty: 30 TABLET | Refills: 0 | Status: CANCELLED | OUTPATIENT
Start: 2024-11-01 | End: 2024-11-11

## 2024-11-01 RX ORDER — AMOXICILLIN AND CLAVULANATE POTASSIUM 875; 125 MG/1; MG/1
1 TABLET, FILM COATED ORAL EVERY 8 HOURS
Qty: 30 TABLET | Refills: 0 | Status: SHIPPED | OUTPATIENT
Start: 2024-11-01 | End: 2024-11-11

## 2024-11-01 NOTE — PROGRESS NOTES
Office visit  Patient: MaryLee B Harris   11/1/2024       Assessment/Plan:       1. Diverticulitis  -     amoxicillin-clavulanate 875-125mg (AUGMENTIN) 875-125 mg per tablet; Take 1 tablet by mouth every 8 (eight) hours. for 10 days  Dispense: 30 tablet; Refill: 0  -     CBC W/ AUTO DIFFERENTIAL; Future; Expected date: 11/01/2024        -discussed the limits of a telehealth visit for diverticulitis with patient; given that we are going into the weekend, I will proceed with empiric treatment for diverticulitis without waiting for CT abdomen and pelvis, given lack of availability over the weekend        -discussed strict ED precautions with patient        -follow up with me next week for in person exam and assessment of response to antibiotics    2. Class 1 obesity due to excess calories with serious comorbidity and body mass index (BMI) of 34.0 to 34.9 in adult        -stable, continue to monitor      Visit type: audiovisual    Patient's Location is: [place]    Face to Face time with patient: 15 minutes  16 minutes of total time spent on the encounter, which includes face to face time and non-face to face time preparing to see the patient (eg, review of tests), Obtaining and/or reviewing separately obtained history, Documenting clinical information in the electronic or other health record, Independently interpreting results (not separately reported) and communicating results to the patient/family/caregiver, or Care coordination (not separately reported).         Each patient to whom he or she provides medical services by telemedicine is:  (1) informed of the relationship between the physician and patient and the respective role of any other health care provider with respect to management of the patient; and (2) notified that he or she may decline to receive medical services by telemedicine and may withdraw from such care at any time.      CHIEF COMPLAINT: concern for diverticulitis    HPI: MaryLee B Harris is a 74  y.o. female who presents for concern for diverticulosis.  She reports she's having a flare up currently.  She had some diarrhea.    Getting semaglutide for weight loss through a medispa.  She reports that they told her she could take it since she doesn't have a history of medullary thyroid cancer.    Review of Systems   Constitutional:  Negative for fever and weight loss.   Gastrointestinal:  Positive for abdominal pain, diarrhea and nausea. Negative for constipation, melena and vomiting.   Genitourinary:  Positive for frequency. Negative for dysuria and hematuria.   Musculoskeletal:  Positive for myalgias.   Neurological:  Negative for headaches.     Answers submitted by the patient for this visit:  Abdominal Pain Questionnaire (Submitted on 11/1/2024)  Chief Complaint: Abdominal pain  Chronicity: recurrent  Onset: in the past 7 days  Onset quality: gradual  Frequency: 2 to 4 times per day  Progression since onset: gradually improving  Pain location: LLQ, suprapubic region, left flank  Pain - numeric: 4/10  Pain quality: burning, cramping  anorexia: No  arthralgias: Yes  belching: Yes  flatus: Yes  hematochezia: No  Aggravated by: certain positions, coughing, movement  Relieved by: being still, passing flatus, recumbency, sitting up  Pain severity: moderate  Treatments tried: nothing  abdominal surgery: No  colon cancer: No  Crohn's disease: No  gallstones: No  GERD: Yes  irritable bowel syndrome: No  kidney stones: No  pancreatitis: No  PUD: No  ulcerative colitis: No  UTI: No      Current Outpatient Medications   Medication Instructions    amLODIPine (NORVASC) 2.5 MG tablet TAKE 1 TABLET BY MOUTH EVERY DAY    amoxicillin-clavulanate 875-125mg (AUGMENTIN) 875-125 mg per tablet 1 tablet, Oral, Every 8 hours    atorvastatin (LIPITOR) 20 MG tablet TAKE 1 TABLET DAILY    calcium-vitamin D3-vitamin K 650 mg-12.5 mcg-40 mcg Chew     fluticasone (FLONASE) 50 mcg/actuation nasal spray instill 2 sprays into each nostril  "once daily    levothyroxine (SYNTHROID) 150 mcg, Oral, Before breakfast    losartan (COZAAR) 50 MG tablet TAKE 1 TABLET DAILY    metFORMIN (GLUCOPHAGE) 1,000 mg, Oral    semaglutide (OZEMPIC) 0.5 mg, Subcutaneous, Every 7 days       Lab Results   Component Value Date    HGBA1C 5.2 02/06/2019     No results found for: "MICALBCREAT"  Lab Results   Component Value Date    LDLCALC 71.4 02/19/2024    LDLCALC 77.6 01/22/2024    CHOL 135 02/19/2024    HDL 48 02/19/2024    TRIG 78 02/19/2024       Lab Results   Component Value Date     02/19/2024    K 3.9 02/19/2024     02/19/2024    CO2 26 02/19/2024    GLU 95 02/19/2024    BUN 9 02/19/2024    CREATININE 0.6 02/19/2024    CALCIUM 9.5 02/19/2024    PROT 7.1 02/19/2024    ALBUMIN 3.8 02/19/2024    BILITOT 0.4 02/19/2024    ALKPHOS 53 (L) 02/19/2024    AST 13 02/19/2024    ALT 14 02/19/2024    ANIONGAP 8 02/19/2024    ESTGFRAFRICA >60.0 06/16/2022    EGFRNONAA >60.0 06/16/2022    WBC 5.98 01/22/2024    HGB 12.1 01/22/2024    HGB 12.1 06/16/2022    HCT 37.2 01/22/2024    MCV 97 01/22/2024     01/22/2024    TSH 0.011 (L) 01/22/2024    HEPCAB Negative 08/23/2016       Lab Results   Component Value Date    FMMQEZLV51KD 47 01/22/2024    AWKKIQYT86LZ 33 11/18/2013         Past Medical History:   Diagnosis Date    Arthritis 06/23/2022    Diverticulosis     colonoscopy 2011, Dr Guerrier    GERD (gastroesophageal reflux disease) 10/22/2020    gastritis EGD 2/2021 Dr Guerrier    History of ankle surgery 06/23/2022    Bilateral ankle fusions, Dr Garcia    HTN (hypertension), benign 02/01/2018    Hypertensive left ventricular hypertrophy, without heart failure 02/01/2018    Dr Dinshaw    Knee pain, left     eval in PT with pes planus, L foot, hip & knee pain, Dr Tirado takes Meloxicam 15 mg daily    Mixed hyperlipidemia 02/15/2018    Osteoporosis, senile 2022    Papillary thyroid carcinoma     Dr Quinn, stage 1, no mets    Postoperative hypothyroidism 10/22/2020    " Scoliosis     noted at PT    Weight gain 06/23/2022    Try metf, avoid ozempic w pap thyr CA     Past Surgical History:   Procedure Laterality Date    right foot surgery  2017    Dr Garcia    THYROIDECTOMY      x2, Dr Quinn       Social History     Tobacco Use    Smoking status: Never    Smokeless tobacco: Never   Substance Use Topics    Alcohol use: Yes       family history includes Colon cancer in her father; Dementia in her mother; Heart disease in her father; Hypertension in her mother.    There were no vitals filed for this visit.  Objective:   Physical Exam  Vitals reviewed.   Constitutional:       General: She is not in acute distress.     Appearance: Normal appearance. She is not diaphoretic.   HENT:      Head: Normocephalic.      Right Ear: External ear normal.      Left Ear: External ear normal.   Eyes:      General: No scleral icterus.     Conjunctiva/sclera: Conjunctivae normal.   Cardiovascular:      Comments: Appears well-perfused  Pulmonary:      Effort: Pulmonary effort is normal. No respiratory distress.   Musculoskeletal:         General: Normal range of motion.      Cervical back: Normal range of motion.   Skin:     Findings: No lesion or rash.   Neurological:      General: No focal deficit present.      Mental Status: She is alert and oriented to person, place, and time.   Psychiatric:         Mood and Affect: Mood normal.         Behavior: Behavior normal.         Thought Content: Thought content normal.             Brandi Pritchard MD  Internal Medicine and Pediatrics

## 2024-11-02 NOTE — TELEPHONE ENCOUNTER
No care due was identified.  [facility] Embedded Care Due Messages. Reference number: [id number].   11/02/2024 8:09:22 AM CDT

## 2024-11-03 ENCOUNTER — PATIENT MESSAGE (OUTPATIENT)
Dept: PRIMARY CARE CLINIC | Facility: CLINIC | Age: 74
End: 2024-11-03
Payer: COMMERCIAL

## 2024-11-03 RX ORDER — METFORMIN HYDROCHLORIDE 500 MG/1
1000 TABLET ORAL
Qty: 180 TABLET | Refills: 0 | OUTPATIENT
Start: 2024-11-03

## 2024-11-03 NOTE — TELEPHONE ENCOUNTER
Refill Decision Note   MaryLee B Hi  is requesting a refill authorization.  Brief Assessment and Rationale for Refill:  Quick Discontinue     Medication Therapy Plan:  E-Prescribing Status: Receipt confirmed by pharmacy (10/29/2024      Comments:     Note composed:2:48 PM 11/03/2024

## 2024-11-06 ENCOUNTER — OFFICE VISIT (OUTPATIENT)
Dept: PRIMARY CARE CLINIC | Facility: CLINIC | Age: 74
End: 2024-11-06
Payer: COMMERCIAL

## 2024-11-06 ENCOUNTER — LAB VISIT (OUTPATIENT)
Dept: LAB | Facility: HOSPITAL | Age: 74
End: 2024-11-06
Attending: STUDENT IN AN ORGANIZED HEALTH CARE EDUCATION/TRAINING PROGRAM
Payer: COMMERCIAL

## 2024-11-06 VITALS
HEIGHT: 62 IN | DIASTOLIC BLOOD PRESSURE: 82 MMHG | OXYGEN SATURATION: 99 % | BODY MASS INDEX: 35.3 KG/M2 | SYSTOLIC BLOOD PRESSURE: 136 MMHG | HEART RATE: 75 BPM | WEIGHT: 191.81 LBS

## 2024-11-06 DIAGNOSIS — K57.92 DIVERTICULITIS: ICD-10-CM

## 2024-11-06 DIAGNOSIS — K21.00 GASTROESOPHAGEAL REFLUX DISEASE WITH ESOPHAGITIS WITHOUT HEMORRHAGE: ICD-10-CM

## 2024-11-06 DIAGNOSIS — K57.92 DIVERTICULITIS: Primary | ICD-10-CM

## 2024-11-06 DIAGNOSIS — E89.0 POSTOPERATIVE HYPOTHYROIDISM: ICD-10-CM

## 2024-11-06 DIAGNOSIS — E78.2 MIXED HYPERLIPIDEMIA: ICD-10-CM

## 2024-11-06 DIAGNOSIS — I10 PRIMARY HYPERTENSION: ICD-10-CM

## 2024-11-06 LAB
BASOPHILS # BLD AUTO: 0.06 K/UL (ref 0–0.2)
BASOPHILS NFR BLD: 1 % (ref 0–1.9)
DIFFERENTIAL METHOD BLD: ABNORMAL
EOSINOPHIL # BLD AUTO: 0.2 K/UL (ref 0–0.5)
EOSINOPHIL NFR BLD: 3.6 % (ref 0–8)
ERYTHROCYTE [DISTWIDTH] IN BLOOD BY AUTOMATED COUNT: 13.7 % (ref 11.5–14.5)
HCT VFR BLD AUTO: 37 % (ref 37–48.5)
HGB BLD-MCNC: 12.2 G/DL (ref 12–16)
IMM GRANULOCYTES # BLD AUTO: 0.01 K/UL (ref 0–0.04)
IMM GRANULOCYTES NFR BLD AUTO: 0.2 % (ref 0–0.5)
LYMPHOCYTES # BLD AUTO: 2.5 K/UL (ref 1–4.8)
LYMPHOCYTES NFR BLD: 43 % (ref 18–48)
MCH RBC QN AUTO: 31.9 PG (ref 27–31)
MCHC RBC AUTO-ENTMCNC: 33 G/DL (ref 32–36)
MCV RBC AUTO: 97 FL (ref 82–98)
MONOCYTES # BLD AUTO: 0.6 K/UL (ref 0.3–1)
MONOCYTES NFR BLD: 9.7 % (ref 4–15)
NEUTROPHILS # BLD AUTO: 2.5 K/UL (ref 1.8–7.7)
NEUTROPHILS NFR BLD: 42.5 % (ref 38–73)
NRBC BLD-RTO: 0 /100 WBC
PLATELET # BLD AUTO: 332 K/UL (ref 150–450)
PMV BLD AUTO: 10.2 FL (ref 9.2–12.9)
RBC # BLD AUTO: 3.82 M/UL (ref 4–5.4)
WBC # BLD AUTO: 5.89 K/UL (ref 3.9–12.7)

## 2024-11-06 PROCEDURE — 1126F AMNT PAIN NOTED NONE PRSNT: CPT | Mod: CPTII,S$GLB,, | Performed by: STUDENT IN AN ORGANIZED HEALTH CARE EDUCATION/TRAINING PROGRAM

## 2024-11-06 PROCEDURE — 1159F MED LIST DOCD IN RCRD: CPT | Mod: CPTII,S$GLB,, | Performed by: STUDENT IN AN ORGANIZED HEALTH CARE EDUCATION/TRAINING PROGRAM

## 2024-11-06 PROCEDURE — 3288F FALL RISK ASSESSMENT DOCD: CPT | Mod: CPTII,S$GLB,, | Performed by: STUDENT IN AN ORGANIZED HEALTH CARE EDUCATION/TRAINING PROGRAM

## 2024-11-06 PROCEDURE — 99214 OFFICE O/P EST MOD 30 MIN: CPT | Mod: S$GLB,,, | Performed by: STUDENT IN AN ORGANIZED HEALTH CARE EDUCATION/TRAINING PROGRAM

## 2024-11-06 PROCEDURE — 3079F DIAST BP 80-89 MM HG: CPT | Mod: CPTII,S$GLB,, | Performed by: STUDENT IN AN ORGANIZED HEALTH CARE EDUCATION/TRAINING PROGRAM

## 2024-11-06 PROCEDURE — 85025 COMPLETE CBC W/AUTO DIFF WBC: CPT | Performed by: STUDENT IN AN ORGANIZED HEALTH CARE EDUCATION/TRAINING PROGRAM

## 2024-11-06 PROCEDURE — 36415 COLL VENOUS BLD VENIPUNCTURE: CPT | Mod: PN | Performed by: STUDENT IN AN ORGANIZED HEALTH CARE EDUCATION/TRAINING PROGRAM

## 2024-11-06 PROCEDURE — 99999 PR PBB SHADOW E&M-EST. PATIENT-LVL IV: CPT | Mod: PBBFAC,,, | Performed by: STUDENT IN AN ORGANIZED HEALTH CARE EDUCATION/TRAINING PROGRAM

## 2024-11-06 PROCEDURE — 3075F SYST BP GE 130 - 139MM HG: CPT | Mod: CPTII,S$GLB,, | Performed by: STUDENT IN AN ORGANIZED HEALTH CARE EDUCATION/TRAINING PROGRAM

## 2024-11-06 PROCEDURE — 1160F RVW MEDS BY RX/DR IN RCRD: CPT | Mod: CPTII,S$GLB,, | Performed by: STUDENT IN AN ORGANIZED HEALTH CARE EDUCATION/TRAINING PROGRAM

## 2024-11-06 PROCEDURE — 1101F PT FALLS ASSESS-DOCD LE1/YR: CPT | Mod: CPTII,S$GLB,, | Performed by: STUDENT IN AN ORGANIZED HEALTH CARE EDUCATION/TRAINING PROGRAM

## 2024-11-06 NOTE — PROGRESS NOTES
"Office visit  Patient: MaryLee B Harris   11/6/2024       Assessment/Plan:       1. Diverticulitis        -improved, will defer imaging at this time as patient is clinically responding to antibiotics        -discussed ED precautions    2. Primary hypertension       -stable, continue current medication    3. Mixed hyperlipidemia       -stable, continue current medication    4. Postoperative hypothyroidism       -stable, continue current medication    5. Gastroesophageal reflux disease with esophagitis without hemorrhage  -stable, continue to monitor          CHIEF COMPLAINT: left lower quadrant pain    HPI: MaryLee B Harris is a 74 y.o. female who presents for follow up.  She was last seen by me on Friday for a virtual visit and prescribed amoxicillin-clavulonate for presumed diverticulitis. Today would be her 6th day of antibiotics and she's feeling better. She reports her pain is significantly improved.  She's not having watery diarrhea, but her stool is loose.  She was on a liquid diet for a few days last week, but now she's on a bland diet with yogurt, cheese, bread.  She denies blood in her stool.          Current Outpatient Medications   Medication Instructions    amLODIPine (NORVASC) 2.5 MG tablet TAKE 1 TABLET BY MOUTH EVERY DAY    amoxicillin-clavulanate 875-125mg (AUGMENTIN) 875-125 mg per tablet 1 tablet, Oral, Every 8 hours    atorvastatin (LIPITOR) 20 MG tablet TAKE 1 TABLET DAILY    calcium-vitamin D3-vitamin K 650 mg-12.5 mcg-40 mcg Chew     fluticasone (FLONASE) 50 mcg/actuation nasal spray instill 2 sprays into each nostril once daily    levothyroxine (SYNTHROID) 150 mcg, Before breakfast    losartan (COZAAR) 50 MG tablet TAKE 1 TABLET DAILY    metFORMIN (GLUCOPHAGE) 1,000 mg, Oral    semaglutide (OZEMPIC) 0.5 mg, Every 7 days       Lab Results   Component Value Date    HGBA1C 5.2 02/06/2019     No results found for: "MICALBCREAT"  Lab Results   Component Value Date    LDLCALC 71.4 02/19/2024    " LDLCALC 77.6 2024    CHOL 135 2024    HDL 48 2024    TRIG 78 2024       Lab Results   Component Value Date     2024    K 3.9 2024     2024    CO2 26 2024    GLU 95 2024    BUN 9 2024    CREATININE 0.6 2024    CALCIUM 9.5 2024    PROT 7.1 2024    ALBUMIN 3.8 2024    BILITOT 0.4 2024    ALKPHOS 53 (L) 2024    AST 13 2024    ALT 14 2024    ANIONGAP 8 2024    ESTGFRAFRICA >60.0 2022    EGFRNONAA >60.0 2022    WBC 5.98 2024    HGB 12.1 2024    HGB 12.1 2022    HCT 37.2 2024    MCV 97 2024     2024    TSH 0.011 (L) 2024    HEPCAB Negative 2016       Lab Results   Component Value Date    FNRONBRO57ST 47 2024    CRDUZLSY62DS 33 2013         Past Medical History:   Diagnosis Date    Arthritis 2022    Diverticulosis     colonoscopy , Dr Guerrier    GERD (gastroesophageal reflux disease) 10/22/2020    gastritis EGD 2021 Dr Guerrier    History of ankle surgery 2022    Bilateral ankle fusions, Dr Garcia    HTN (hypertension), benign 2018    Hypertensive left ventricular hypertrophy, without heart failure 2018    Dr Ellsworth    Knee pain, left     eval in PT with pes planus, L foot, hip & knee pain, Dr Tirado takes Meloxicam 15 mg daily    Mixed hyperlipidemia 02/15/2018    Osteoporosis, senile     Papillary thyroid carcinoma     Dr Quinn, stage 1, no mets    Postoperative hypothyroidism 10/22/2020    Scoliosis     noted at PT    Weight gain 2022    Try metf, avoid ozempic w pap thyr CA     Past Surgical History:   Procedure Laterality Date     SECTION  10/9/1991    right foot surgery  2017    Dr Garcia    THYROIDECTOMY      x2, Dr Quinn    TONSILLECTOMY  1953       Social History     Tobacco Use    Smoking status: Never    Smokeless tobacco: Never   Substance Use Topics    Alcohol  "use: Yes       family history includes Arthritis in her maternal grandfather and mother; Cancer in her father; Colon cancer in her father; Dementia in her mother; Hearing loss in her father; Heart disease in her father; Hypertension in her father and mother; Stroke in her father.    Vitals:    11/06/24 0754 11/06/24 0821   BP: (!) 130/90 136/82   Pulse: 75    SpO2: 99%    Weight: 87 kg (191 lb 12.8 oz)    Height: 5' 2" (1.575 m)    PainSc: 0-No pain      Objective:   Physical Exam  Vitals reviewed.   Constitutional:       General: She is not in acute distress.     Appearance: Normal appearance. She is well-developed.   HENT:      Head: Normocephalic and atraumatic.      Right Ear: External ear normal.      Left Ear: External ear normal.      Nose: Nose normal.      Mouth/Throat:      Mouth: Mucous membranes are moist.   Eyes:      General: No scleral icterus.        Right eye: No discharge.         Left eye: No discharge.      Conjunctiva/sclera: Conjunctivae normal.   Cardiovascular:      Rate and Rhythm: Normal rate and regular rhythm.      Heart sounds: Normal heart sounds. No murmur heard.     No friction rub. No gallop.   Pulmonary:      Effort: Pulmonary effort is normal. No respiratory distress.      Breath sounds: Normal breath sounds. No wheezing, rhonchi or rales.   Abdominal:      General: Abdomen is flat. Bowel sounds are normal. There is no distension.      Palpations: Abdomen is soft. There is no mass.      Tenderness: There is abdominal tenderness (in LLQ). There is no guarding or rebound.   Musculoskeletal:         General: No deformity.      Right lower leg: No edema.      Left lower leg: No edema.   Skin:     General: Skin is warm and dry.   Neurological:      General: No focal deficit present.      Mental Status: She is alert and oriented to person, place, and time.   Psychiatric:         Mood and Affect: Mood normal.         Behavior: Behavior normal.         Thought Content: Thought content " normal.             Brandi Pritchard MD  Internal Medicine and Pediatrics

## 2024-11-13 ENCOUNTER — PATIENT MESSAGE (OUTPATIENT)
Dept: PRIMARY CARE CLINIC | Facility: CLINIC | Age: 74
End: 2024-11-13
Payer: COMMERCIAL

## 2024-12-05 ENCOUNTER — PATIENT MESSAGE (OUTPATIENT)
Dept: PRIMARY CARE CLINIC | Facility: CLINIC | Age: 74
End: 2024-12-05
Payer: COMMERCIAL

## 2024-12-05 RX ORDER — HYDROCORTISONE 25 MG/G
CREAM TOPICAL 2 TIMES DAILY
Qty: 40 G | Refills: 11 | Status: SHIPPED | OUTPATIENT
Start: 2024-12-05

## 2024-12-09 ENCOUNTER — PATIENT MESSAGE (OUTPATIENT)
Dept: PRIMARY CARE CLINIC | Facility: CLINIC | Age: 74
End: 2024-12-09
Payer: COMMERCIAL

## 2024-12-11 ENCOUNTER — PATIENT MESSAGE (OUTPATIENT)
Dept: PRIMARY CARE CLINIC | Facility: CLINIC | Age: 74
End: 2024-12-11
Payer: COMMERCIAL

## 2024-12-12 ENCOUNTER — OFFICE VISIT (OUTPATIENT)
Dept: PRIMARY CARE CLINIC | Facility: CLINIC | Age: 74
End: 2024-12-12
Payer: COMMERCIAL

## 2024-12-12 ENCOUNTER — PATIENT MESSAGE (OUTPATIENT)
Dept: PRIMARY CARE CLINIC | Facility: CLINIC | Age: 74
End: 2024-12-12

## 2024-12-12 DIAGNOSIS — K57.92 DIVERTICULITIS: ICD-10-CM

## 2024-12-12 DIAGNOSIS — T36.95XA ANTIBIOTIC-ASSOCIATED DIARRHEA: ICD-10-CM

## 2024-12-12 DIAGNOSIS — K64.9 BLEEDING HEMORRHOID: Primary | ICD-10-CM

## 2024-12-12 DIAGNOSIS — K52.1 ANTIBIOTIC-ASSOCIATED DIARRHEA: ICD-10-CM

## 2024-12-12 PROCEDURE — 99214 OFFICE O/P EST MOD 30 MIN: CPT | Mod: 95,,, | Performed by: FAMILY MEDICINE

## 2024-12-12 PROCEDURE — 1160F RVW MEDS BY RX/DR IN RCRD: CPT | Mod: CPTII,95,, | Performed by: FAMILY MEDICINE

## 2024-12-12 PROCEDURE — G2211 COMPLEX E/M VISIT ADD ON: HCPCS | Mod: 95,,, | Performed by: FAMILY MEDICINE

## 2024-12-12 PROCEDURE — 1159F MED LIST DOCD IN RCRD: CPT | Mod: CPTII,95,, | Performed by: FAMILY MEDICINE

## 2024-12-12 RX ORDER — METRONIDAZOLE 500 MG/1
500 TABLET ORAL EVERY 12 HOURS
Qty: 14 TABLET | Refills: 0 | Status: SHIPPED | OUTPATIENT
Start: 2024-12-12 | End: 2024-12-19

## 2024-12-12 NOTE — PROGRESS NOTES
Assessment:     1. Bleeding hemorrhoid    2. Diverticulitis    3. Antibiotic-associated diarrhea      Plan:     Diverticulitis  11/1/24 Tx w Augmentin  Ongoing diarrhea during her drive to [place] for [employer].   Then blood with wiping & worsening hemorrhoids since    Bleeding hemorrhoid  Intense pain despite Hydrocortisone 2.5 rectal cream, continue this  Stop diarrhea to stop irritation  Refer CRS    Antibiotic-associated diarrhea  2 x a day w constant seepage. Lots of gas.   Flagyl BID x 7d  Metamucil twice a day to bulk stool      She has annual w me in Jan, labs prior    HPI: MaryLee B Harris is a 74 y.o. female with is here today for hemorroid     The patient location is: home  The chief complaint leading to consultation is: hemorrhoids    Visit type: audiovisual    Face to Face time with patient: 14  17 minutes of total time spent on the encounter, which includes face to face time and non-face to face time preparing to see the patient (eg, review of tests), Obtaining and/or reviewing separately obtained history, Documenting clinical information in the electronic or other health record, Independently interpreting results (not separately reported) and communicating results to the patient/family/caregiver, or Care coordination (not separately reported).         Each patient to whom he or she provides medical services by telemedicine is:  (1) informed of the relationship between the physician and patient and the respective role of any other health care provider with respect to management of the patient; and (2) notified that he or she may decline to receive medical services by telemedicine and may withdraw from such care at any time.    Notes:       History of Present Illness    CHIEF COMPLAINT:  Patient presents today for follow-up on diverticulitis and hemorrhoids.    DIVERTICULITIS:  She was diagnosed with diverticulitis in October and completed a 10-day course of antibiotics, which resolved the  "condition. However, she continued to experience diarrhea after completing the antibiotic treatment.    HEMORRHOIDS AND ANAL DISCOMFORT:  She experienced blood when wiping during a Thanksgiving trip, which she attributed to hemorrhoids exacerbated by the dry climate. Upon returning home, she developed severe pain and swelling in the anal area, which has been excruciatingly painful and debilitating. She reports constant seeping, leakage, and gas. The hemorrhoids were initially protruding but have since shrunk somewhat, though they remain painful. She describes the affected area as cushiony rather than hard. She has tried various OTC remedies without significant relief. A prescription provided by the doctor has offered some improvement, but she continues to experience considerable discomfort. The pain has been so severe that it has limited her mobility, though she reports being able to walk a little now. She expresses concern about potential constipation if attempting to reduce bowel movements.    GASTROINTESTINAL SYMPTOMS:  She reports experiencing loose stools with constant seeping and gas. She has frequent bowel movements with constant leakage and seepage, accompanied by significant gas. She denies blood in the stool itself but notes a small amount of blood when wiping. The condition has prevented her from leaving the bed or bathroom for the past week and a half. She is unable to sit comfortably and finds eating difficult due to increased pressure. She experiences constant urges for bowel movements, describing it as "constantly trying to squirt out." She reports having bowel movements once or twice daily but emphasizes the persistent seeping and leakage.    MEDICAL HISTORY:  She reports a history of hemorrhoids following childbirth, but notes that the current episode is significantly more severe than previous occurrences. She underwent a colonoscopy in 2021, with no significant findings mentioned.    CURRENT " "TREATMENT:  She reports using ice packs and applying topical creams as part of her current treatment regimen. She expresses concern about potentially overusing the creams but has been reassured that their use is appropriate. She describes difficulty working due to her condition, attempting to lay on her side with her computer to maintain productivity.      ROS:  ROS as indicated in HPI.         Denies chest pain, shortness of breath    Current Outpatient Medications   Medication Instructions    amLODIPine (NORVASC) 2.5 MG tablet TAKE 1 TABLET BY MOUTH EVERY DAY    atorvastatin (LIPITOR) 20 MG tablet TAKE 1 TABLET DAILY    calcium-vitamin D3-vitamin K 650 mg-12.5 mcg-40 mcg Chew     fluticasone (FLONASE) 50 mcg/actuation nasal spray instill 2 sprays into each nostril once daily    hydrocortisone 2.5 % cream Topical (Top), 2 times daily, For hemorrhoids    levothyroxine (SYNTHROID) 150 mcg, Before breakfast    losartan (COZAAR) 50 MG tablet TAKE 1 TABLET DAILY    metFORMIN (GLUCOPHAGE) 1,000 mg, Oral    metroNIDAZOLE (FLAGYL) 500 mg, Oral, Every 12 hours    semaglutide (OZEMPIC) 0.5 mg, Every 7 days       Lab Results   Component Value Date    HGBA1C 5.2 02/06/2019     No results found for: "MICALBCREAT"  Lab Results   Component Value Date    LDLCALC 71.4 02/19/2024    LDLCALC 77.6 01/22/2024    CHOL 135 02/19/2024    HDL 48 02/19/2024    TRIG 78 02/19/2024       Lab Results   Component Value Date     02/19/2024    K 3.9 02/19/2024     02/19/2024    CO2 26 02/19/2024    GLU 95 02/19/2024    BUN 9 02/19/2024    CREATININE 0.6 02/19/2024    CALCIUM 9.5 02/19/2024    PROT 7.1 02/19/2024    ALBUMIN 3.8 02/19/2024    BILITOT 0.4 02/19/2024    ALKPHOS 53 (L) 02/19/2024    AST 13 02/19/2024    ALT 14 02/19/2024    ANIONGAP 8 02/19/2024    ESTGFRAFRICA >60.0 06/16/2022    EGFRNONAA >60.0 06/16/2022    WBC 5.89 11/06/2024    HGB 12.2 11/06/2024    HGB 12.1 01/22/2024    HCT 37.0 11/06/2024    MCV 97 11/06/2024    "  2024    TSH 0.011 (L) 2024    HEPCAB Negative 2016       Lab Results   Component Value Date    GLTUTXBR69GP 47 2024    IPLVQBTK33YB 33 2013         Past Medical History:   Diagnosis Date    Arthritis 2022    Diverticulosis     colonoscopy , Dr Guerrier    GERD (gastroesophageal reflux disease) 10/22/2020    gastritis EGD 2021 Dr Guerrier    History of ankle surgery 2022    Bilateral ankle fusions, Dr Garcia    HTN (hypertension), benign 2018    Hypertensive left ventricular hypertrophy, without heart failure 2018    Dr Ellsworth    Knee pain, left     eval in PT with pes planus, L foot, hip & knee pain, Dr Tirado takes Meloxicam 15 mg daily    Mixed hyperlipidemia 02/15/2018    Osteoporosis, senile     Papillary thyroid carcinoma     Dr Quinn, stage 1, no mets    Postoperative hypothyroidism 10/22/2020    Scoliosis     noted at PT    Weight gain 2022    Try metf, avoid ozempic w pap thyr CA     Past Surgical History:   Procedure Laterality Date     SECTION  10/9/1991    right foot surgery  2017    Dr Garcia    THYROIDECTOMY      x2, Dr Quinn    TONSILLECTOMY  1953     There were no vitals filed for this visit.  Wt Readings from Last 5 Encounters:   24 87 kg (191 lb 12.8 oz)   10/09/24 86.2 kg (190 lb)   24 86.3 kg (190 lb 4.1 oz)   24 85.5 kg (188 lb 7.9 oz)   22 85.7 kg (188 lb 15 oz)     Objective:   Physical Exam

## 2024-12-12 NOTE — ASSESSMENT & PLAN NOTE
11/1/24 Tx w Augmentin  Ongoing diarrhea during her drive to [place] for Thanksgiving.   Then blood with wiping & worsening hemorrhoids since

## 2024-12-12 NOTE — ASSESSMENT & PLAN NOTE
Intense pain despite Hydrocortisone 2.5 rectal cream, continue this  Stop diarrhea to stop irritation  Refer [facility]S

## 2025-01-17 ENCOUNTER — LAB VISIT (OUTPATIENT)
Dept: LAB | Facility: HOSPITAL | Age: 75
End: 2025-01-17
Attending: FAMILY MEDICINE
Payer: COMMERCIAL

## 2025-01-17 DIAGNOSIS — E78.2 MIXED HYPERLIPIDEMIA: ICD-10-CM

## 2025-01-17 LAB
ALBUMIN SERPL BCP-MCNC: 3.5 G/DL (ref 3.5–5.2)
ALP SERPL-CCNC: 77 U/L (ref 40–150)
ALT SERPL W/O P-5'-P-CCNC: 20 U/L (ref 10–44)
ANION GAP SERPL CALC-SCNC: 10 MMOL/L (ref 8–16)
AST SERPL-CCNC: 16 U/L (ref 10–40)
BILIRUB SERPL-MCNC: 0.3 MG/DL (ref 0.1–1)
BUN SERPL-MCNC: 11 MG/DL (ref 8–23)
CALCIUM SERPL-MCNC: 9 MG/DL (ref 8.7–10.5)
CHLORIDE SERPL-SCNC: 109 MMOL/L (ref 95–110)
CHOLEST SERPL-MCNC: 117 MG/DL (ref 120–199)
CHOLEST/HDLC SERPL: 3 {RATIO} (ref 2–5)
CO2 SERPL-SCNC: 23 MMOL/L (ref 23–29)
CREAT SERPL-MCNC: 0.6 MG/DL (ref 0.5–1.4)
ERYTHROCYTE [DISTWIDTH] IN BLOOD BY AUTOMATED COUNT: 14.7 % (ref 11.5–14.5)
EST. GFR  (NO RACE VARIABLE): >60 ML/MIN/1.73 M^2
GLUCOSE SERPL-MCNC: 93 MG/DL (ref 70–110)
HCT VFR BLD AUTO: 35.6 % (ref 37–48.5)
HDLC SERPL-MCNC: 39 MG/DL (ref 40–75)
HDLC SERPL: 33.3 % (ref 20–50)
HGB BLD-MCNC: 11.3 G/DL (ref 12–16)
LDLC SERPL CALC-MCNC: 64.4 MG/DL (ref 63–159)
MCH RBC QN AUTO: 30.7 PG (ref 27–31)
MCHC RBC AUTO-ENTMCNC: 31.7 G/DL (ref 32–36)
MCV RBC AUTO: 97 FL (ref 82–98)
NONHDLC SERPL-MCNC: 78 MG/DL
PLATELET # BLD AUTO: 373 K/UL (ref 150–450)
PMV BLD AUTO: 9.5 FL (ref 9.2–12.9)
POTASSIUM SERPL-SCNC: 3.9 MMOL/L (ref 3.5–5.1)
PROT SERPL-MCNC: 7.5 G/DL (ref 6–8.4)
RBC # BLD AUTO: 3.68 M/UL (ref 4–5.4)
SODIUM SERPL-SCNC: 142 MMOL/L (ref 136–145)
T4 FREE SERPL-MCNC: 1.36 NG/DL (ref 0.71–1.51)
TRIGL SERPL-MCNC: 68 MG/DL (ref 30–150)
TSH SERPL DL<=0.005 MIU/L-ACNC: 0.09 UIU/ML (ref 0.4–4)
WBC # BLD AUTO: 7.73 K/UL (ref 3.9–12.7)

## 2025-01-17 PROCEDURE — 80053 COMPREHEN METABOLIC PANEL: CPT | Performed by: FAMILY MEDICINE

## 2025-01-17 PROCEDURE — 85027 COMPLETE CBC AUTOMATED: CPT | Performed by: FAMILY MEDICINE

## 2025-01-17 PROCEDURE — 80061 LIPID PANEL: CPT | Performed by: FAMILY MEDICINE

## 2025-01-17 PROCEDURE — 84443 ASSAY THYROID STIM HORMONE: CPT | Performed by: FAMILY MEDICINE

## 2025-01-17 PROCEDURE — 84439 ASSAY OF FREE THYROXINE: CPT | Performed by: FAMILY MEDICINE

## 2025-01-17 PROCEDURE — 36415 COLL VENOUS BLD VENIPUNCTURE: CPT | Mod: PN | Performed by: FAMILY MEDICINE

## 2025-01-18 DIAGNOSIS — I10 ESSENTIAL HYPERTENSION: ICD-10-CM

## 2025-01-20 RX ORDER — AMLODIPINE BESYLATE 2.5 MG/1
TABLET ORAL
Qty: 90 TABLET | Refills: 3 | Status: SHIPPED | OUTPATIENT
Start: 2025-01-20

## 2025-01-29 ENCOUNTER — OFFICE VISIT (OUTPATIENT)
Dept: PRIMARY CARE CLINIC | Facility: CLINIC | Age: 75
End: 2025-01-29
Payer: COMMERCIAL

## 2025-01-29 ENCOUNTER — OFFICE VISIT (OUTPATIENT)
Dept: SURGERY | Facility: CLINIC | Age: 75
End: 2025-01-29
Attending: COLON & RECTAL SURGERY
Payer: COMMERCIAL

## 2025-01-29 VITALS
BODY MASS INDEX: 35.54 KG/M2 | DIASTOLIC BLOOD PRESSURE: 76 MMHG | HEIGHT: 62 IN | SYSTOLIC BLOOD PRESSURE: 144 MMHG | HEART RATE: 77 BPM | WEIGHT: 193.13 LBS

## 2025-01-29 VITALS
TEMPERATURE: 99 F | HEIGHT: 62 IN | WEIGHT: 192.69 LBS | OXYGEN SATURATION: 98 % | DIASTOLIC BLOOD PRESSURE: 70 MMHG | SYSTOLIC BLOOD PRESSURE: 125 MMHG | BODY MASS INDEX: 35.46 KG/M2 | HEART RATE: 71 BPM

## 2025-01-29 DIAGNOSIS — M81.0 OSTEOPOROSIS, SENILE: ICD-10-CM

## 2025-01-29 DIAGNOSIS — E78.2 MIXED HYPERLIPIDEMIA: ICD-10-CM

## 2025-01-29 DIAGNOSIS — R63.5 WEIGHT GAIN: ICD-10-CM

## 2025-01-29 DIAGNOSIS — D50.8 IRON DEFICIENCY ANEMIA SECONDARY TO INADEQUATE DIETARY IRON INTAKE: ICD-10-CM

## 2025-01-29 DIAGNOSIS — R19.7 DIARRHEA, UNSPECIFIED TYPE: Primary | ICD-10-CM

## 2025-01-29 DIAGNOSIS — K64.9 BLEEDING HEMORRHOID: ICD-10-CM

## 2025-01-29 DIAGNOSIS — K57.92 DIVERTICULITIS: ICD-10-CM

## 2025-01-29 DIAGNOSIS — Z00.00 ROUTINE GENERAL MEDICAL EXAMINATION AT A HEALTH CARE FACILITY: Primary | ICD-10-CM

## 2025-01-29 DIAGNOSIS — E89.0 POSTOPERATIVE HYPOTHYROIDISM: ICD-10-CM

## 2025-01-29 DIAGNOSIS — I10 PRIMARY HYPERTENSION: ICD-10-CM

## 2025-01-29 PROBLEM — K52.1 ANTIBIOTIC-ASSOCIATED DIARRHEA: Status: RESOLVED | Noted: 2024-12-12 | Resolved: 2025-01-29

## 2025-01-29 PROBLEM — T36.95XA ANTIBIOTIC-ASSOCIATED DIARRHEA: Status: RESOLVED | Noted: 2024-12-12 | Resolved: 2025-01-29

## 2025-01-29 PROCEDURE — 99397 PER PM REEVAL EST PAT 65+ YR: CPT | Mod: S$GLB,,, | Performed by: FAMILY MEDICINE

## 2025-01-29 PROCEDURE — 1126F AMNT PAIN NOTED NONE PRSNT: CPT | Mod: CPTII,S$GLB,, | Performed by: FAMILY MEDICINE

## 2025-01-29 PROCEDURE — 99999 PR PBB SHADOW E&M-EST. PATIENT-LVL IV: CPT | Mod: PBBFAC,,, | Performed by: COLON & RECTAL SURGERY

## 2025-01-29 PROCEDURE — 3077F SYST BP >= 140 MM HG: CPT | Mod: CPTII,S$GLB,, | Performed by: COLON & RECTAL SURGERY

## 2025-01-29 PROCEDURE — 99204 OFFICE O/P NEW MOD 45 MIN: CPT | Mod: 25,S$GLB,, | Performed by: COLON & RECTAL SURGERY

## 2025-01-29 PROCEDURE — 3288F FALL RISK ASSESSMENT DOCD: CPT | Mod: CPTII,S$GLB,, | Performed by: FAMILY MEDICINE

## 2025-01-29 PROCEDURE — 99999 PR PBB SHADOW E&M-EST. PATIENT-LVL IV: CPT | Mod: PBBFAC,,, | Performed by: FAMILY MEDICINE

## 2025-01-29 PROCEDURE — 1101F PT FALLS ASSESS-DOCD LE1/YR: CPT | Mod: CPTII,S$GLB,, | Performed by: COLON & RECTAL SURGERY

## 2025-01-29 PROCEDURE — 1101F PT FALLS ASSESS-DOCD LE1/YR: CPT | Mod: CPTII,S$GLB,, | Performed by: FAMILY MEDICINE

## 2025-01-29 PROCEDURE — 46600 DIAGNOSTIC ANOSCOPY SPX: CPT | Mod: S$GLB,,, | Performed by: COLON & RECTAL SURGERY

## 2025-01-29 PROCEDURE — 1160F RVW MEDS BY RX/DR IN RCRD: CPT | Mod: CPTII,S$GLB,, | Performed by: FAMILY MEDICINE

## 2025-01-29 PROCEDURE — 3078F DIAST BP <80 MM HG: CPT | Mod: CPTII,S$GLB,, | Performed by: COLON & RECTAL SURGERY

## 2025-01-29 PROCEDURE — 3074F SYST BP LT 130 MM HG: CPT | Mod: CPTII,S$GLB,, | Performed by: FAMILY MEDICINE

## 2025-01-29 PROCEDURE — 1159F MED LIST DOCD IN RCRD: CPT | Mod: CPTII,S$GLB,, | Performed by: FAMILY MEDICINE

## 2025-01-29 PROCEDURE — 1126F AMNT PAIN NOTED NONE PRSNT: CPT | Mod: CPTII,S$GLB,, | Performed by: COLON & RECTAL SURGERY

## 2025-01-29 PROCEDURE — 1160F RVW MEDS BY RX/DR IN RCRD: CPT | Mod: CPTII,S$GLB,, | Performed by: COLON & RECTAL SURGERY

## 2025-01-29 PROCEDURE — 3078F DIAST BP <80 MM HG: CPT | Mod: CPTII,S$GLB,, | Performed by: FAMILY MEDICINE

## 2025-01-29 PROCEDURE — 3288F FALL RISK ASSESSMENT DOCD: CPT | Mod: CPTII,S$GLB,, | Performed by: COLON & RECTAL SURGERY

## 2025-01-29 PROCEDURE — 1159F MED LIST DOCD IN RCRD: CPT | Mod: CPTII,S$GLB,, | Performed by: COLON & RECTAL SURGERY

## 2025-01-29 RX ORDER — AZITHROMYCIN 250 MG/1
TABLET, FILM COATED ORAL
COMMUNITY
Start: 2025-01-10 | End: 2025-01-29

## 2025-01-29 RX ORDER — BENZONATATE 200 MG/1
400 CAPSULE ORAL EVERY 8 HOURS PRN
COMMUNITY
Start: 2025-01-10 | End: 2025-01-29

## 2025-01-29 RX ORDER — ALBUTEROL SULFATE 0.83 MG/ML
2.5 SOLUTION RESPIRATORY (INHALATION) EVERY 4 HOURS PRN
COMMUNITY
Start: 2025-01-10

## 2025-01-29 NOTE — ASSESSMENT & PLAN NOTE
ENT Dr Quinn, stage 1 Papillary cancer, no mets, Thyroidectomy & Radiation 1998   TSH 0.08 - discuss w Dr Quinn  Continue Levothyroxine 150 daily, follow w ENT

## 2025-01-29 NOTE — PROGRESS NOTES
Assessment:     1. Routine general medical examination at a health care facility    2. Primary hypertension    3. Mixed hyperlipidemia    4. Postoperative hypothyroidism    5. Osteoporosis, senile    6. Diverticulitis    7. Bleeding hemorrhoid    8. Iron deficiency anemia secondary to inadequate dietary iron intake    9. Weight gain      Plan:     Routine general medical examination at a health care facility  Follow with GYN for female health & cancer prevention  Move more, High fiber, good fat (avocado, olive oil, nuts) foods  Eat more food grown from the earth (picked from trees or out of the ground)  Colon cancer screening at 44 yo  Follow up yearly with LABS ONE WEEK PRIOR so we can discuss at your visit      Primary hypertension  Stable , Continue Losartan 50 daily & AMlodipine 2.5 daily      Try to stop these things that can elevate blood pressure: ALCOHOL, salt, caffeine, energy drinks, diet pills, sudafed, taking NSAIDS daily (advil, alleve, ibuprofen, naproxen) and birth control  DECREASE SALT (fast foods, frozen, canned, processed foods, ham, turkey, fried foods, chips, crackers, etc) & drink 8 glasses of water a day with minimal caffeine   Your HEART is RELAXED when Blood pressure < 129/79.   Your heart thickens & weakens when BP is always > 140/90      Mixed hyperlipidemia  Stable, continue Atorvastatin 20 daily    Move more, sit less  High fiber, good fat (avocado, olive oil, nuts) foods  Try to eat 5 fresh COLORS a day      Postoperative hypothyroidism  ENT Dr Quinn, stage 1 Papillary cancer, no mets, Thyroidectomy & Radiation 1998   TSH 0.08 - discuss w Dr Quinn  Continue Levothyroxine 150 daily, follow w ENT    Osteoporosis, senile  Your Bone density test shows OSTEOPOROSIS    I recommend daily exercise & Balance training     =============  DAILY MOVEMENT (walking, carrying light weights),  BALANCE training  & STRETCHING  are the BEST ways to strengthen the bone where your muscles insert &  prevent future fractures.   Intake 1200mg of  CALCIUM daily (Seeds, Cheese, Yogurt, Sardines & canned salmon, Beans & lentils, Almonds, Whey protein powder supplement, Leafy greens)  Plus 800 units of VITAMIN D daily - 10 minutes of direct sunlight or a supplement .       Diverticulitis  Began October, 11/1/24 Tx w Augmentin,   Ongoing diarrhea during her drive to [place] for Thanksgiving.   & worsening hemorrhoids   12/12 Tx w Flagyl for presumed C Diff  Has appt w CRS Dr Chou today    Bleeding hemorrhoid  Has appt w CRS Dr Chou today    Iron deficiency anemia secondary to inadequate dietary iron intake  Hgb 11.3  Colonoscopy nml 2021 Dr Guerrier    You are mildly anemic, meaning your blood count (which carries oxygen throughout your body) is LOW.      Focus on eating  more IRON RICH FOODS every day -  Red meat, soybeans (edamame), lentils, raw or cooked spinach, beef, beans (lima, navy, kidney, ayala), turkey & chicken dark meat, Oysters, chickpeas (hummus), fortified cereal, pumpkin seeds, sesame seeds       Weight gain  She stopped  Metformin & ozempic when she had diarrhea    Will focus on movement & food choices.           HPI: MaryLee B Harris is a 74 y.o. female with is here today for general exam.     History of Present Illness    CHIEF COMPLAINT:  Patient presents today for follow-up after experiencing multiple health issues over the past three months.    GASTROINTESTINAL HISTORY:  She experienced a diverticulosis flare-up in October with associated pain, initially treated with a 10-day course of Ciprofloxacin. While acute symptoms resolved, she developed persistent diarrhea and loose stools following the antibiotic treatment. During a subsequent trip to [place], symptoms worsened despite maintaining a simple diet and minimal alcohol intake (one drink over 10 days), requiring a second course of antibiotics upon return. She was prescribed Metamucil twice daily for management.    CURRENT GI  SYMPTOMS:  She reports persistent feeling of swelling in the rectal area with frequent soft stools. She describes sensation of weak and loose muscles in the rectal region. She developed hemorrhoids which were exacerbated by a prolonged car ride, causing significant discomfort and limited mobility for approximately two weeks.    DIETARY MODIFICATIONS:  She avoids beans, particularly red beans and larger varieties, due to GI discomfort including rumbling and gas. She notes that chickpeas and hummus are better tolerated, and limits bean consumption to no more than daily.    RECENT RESPIRATORY ILLNESS:  Following family visits during [place], she developed a severe cough requiring antibiotics and albuterol nebulizer treatment 4 times daily. She reports significant improvement within 3-4 days of starting treatment.    MEDICATION CHANGES:  She discontinued metformin in October due to GI issues. She also stopped semaglutide after four weekly doses at the lowest strength due to concerns about potential stomach upset. Neither medication has been resumed in the past three months.    MEDICAL HISTORY:  She underwent thyroidectomy and radiation treatment for thyroid cancer approximately 25 years ago. Her last colonoscopy was performed 5 years ago with recommendation for 5-year follow-up.      ROS:  ROS as indicated in HPI.         Denies chest pain, shortness of breath    Current Outpatient Medications   Medication Instructions    albuterol (PROVENTIL) 2.5 mg, Every 4 hours PRN    amLODIPine (NORVASC) 2.5 MG tablet TAKE 1 TABLET BY MOUTH EVERY DAY    atorvastatin (LIPITOR) 20 MG tablet TAKE 1 TABLET DAILY    calcium-vitamin D3-vitamin K 650 mg-12.5 mcg-40 mcg Chew     fluticasone (FLONASE) 50 mcg/actuation nasal spray instill 2 sprays into each nostril once daily    hydrocortisone 2.5 % cream Topical (Top), 2 times daily, For hemorrhoids    levothyroxine (SYNTHROID) 150 mcg, Before breakfast    losartan (COZAAR) 50 MG tablet  "TAKE 1 TABLET DAILY       Lab Results   Component Value Date    HGBA1C 5.2 2019     No results found for: "MICALBCREAT"  Lab Results   Component Value Date    LDLCALC 64.4 2025    LDLCALC 71.4 2024    CHOL 117 (L) 2025    HDL 39 (L) 2025    TRIG 68 2025       Lab Results   Component Value Date     2025    K 3.9 2025     2025    CO2 23 2025    GLU 93 2025    BUN 11 2025    CREATININE 0.6 2025    CALCIUM 9.0 2025    PROT 7.5 2025    ALBUMIN 3.5 2025    BILITOT 0.3 2025    ALKPHOS 77 2025    AST 16 2025    ALT 20 2025    ANIONGAP 10 2025    ESTGFRAFRICA >60.0 2022    EGFRNONAA >60.0 2022    WBC 7.73 2025    HGB 11.3 (L) 2025    HGB 12.2 2024    HCT 35.6 (L) 2025    MCV 97 2025     2025    TSH 0.087 (L) 2025    HEPCAB Negative 2016       Lab Results   Component Value Date    PGLUOGKK34AP 47 2024    MPAAQXRR26FV 33 2013         Past Medical History:   Diagnosis Date    Arthritis 2022    Diverticulosis     colonoscopy , Dr Guerrier    GERD (gastroesophageal reflux disease) 10/22/2020    gastritis EGD 2021 Dr Guerrier    History of ankle surgery 2022    Bilateral ankle fusions, Dr Garcia    HTN (hypertension), benign 2018    Hypertensive left ventricular hypertrophy, without heart failure 2018    Dr Ellsworth    Knee pain, left     eval in PT with pes planus, L foot, hip & knee pain, Dr Tirado takes Meloxicam 15 mg daily    Mixed hyperlipidemia 02/15/2018    Osteoporosis, senile     Papillary thyroid carcinoma     Dr Quinn, stage 1, no mets    Postoperative hypothyroidism 10/22/2020    Scoliosis     noted at PT    Weight gain 2022    Try metf, avoid ozempic w pap thyr CA     Past Surgical History:   Procedure Laterality Date     SECTION  10/9/1991    right foot " "surgery  2017    Dr Garcia    THYROIDECTOMY      x2, Dr Quinn    TONSILLECTOMY  1953     Vitals:    01/29/25 0835 01/29/25 0853 01/29/25 0911   BP: (!) 144/76 136/70 125/70   Pulse: 71     Temp: 98.7 °F (37.1 °C)     TempSrc: Oral     SpO2: 98%     Weight: 87.4 kg (192 lb 10.9 oz)     Height: 5' 2" (1.575 m)     PainSc: 0-No pain       Wt Readings from Last 5 Encounters:   01/29/25 87.4 kg (192 lb 10.9 oz)   11/06/24 87 kg (191 lb 12.8 oz)   10/09/24 86.2 kg (190 lb)   02/26/24 86.3 kg (190 lb 4.1 oz)   01/24/24 85.5 kg (188 lb 7.9 oz)     Objective:   Physical Exam  Constitutional:       Appearance: She is well-developed.   Eyes:      Pupils: Pupils are equal, round, and reactive to light.      Comments: Conjunctiva not pale   Cardiovascular:      Rate and Rhythm: Normal rate and regular rhythm.      Heart sounds: Normal heart sounds. No murmur heard.  Pulmonary:      Effort: Pulmonary effort is normal.      Breath sounds: Normal breath sounds. No wheezing.   Abdominal:      General: Bowel sounds are normal. There is no distension.      Palpations: Abdomen is soft. There is no mass.      Tenderness: There is no abdominal tenderness. There is no guarding or rebound.   Musculoskeletal:      Cervical back: Neck supple.   Skin:     General: Skin is warm and dry.   Neurological:      Mental Status: She is alert.   Psychiatric:         Behavior: Behavior normal.                                     "

## 2025-01-29 NOTE — PROGRESS NOTES
CRS Office Visit History and Physical    Referring MD:   Meeta Maciel Md  [address] [place] [address]    SUBJECTIVE:     Chief Complaint: Hemorrhoids, diverticulitis, diarrhea, fecal incontinence    History of Present Illness:  The patient is new patient to this practice.   Course is as follows:  Patient is a 74 y.o. female presents with concern for diverticulitis, recent hemorrhoid swelling, and diarrhea.  Developed abdominal pain in October, was given abx for presumed diverticulitis by PCP (no imaging). After this she developed a flare of her hemorrhoids which she treated with OTC cream. She was also given Flagyl by her PCP (no Cdiff testing that I can see). She felt better after 2 weeks.  Has persistent loose stools that bother her. Has been taking Metamucil just since this all started. Has trouble with control of loose stools.    Last Colonoscopy: 2021 (4mm polyp)  Prior abdominal surgery: Yes -   Prior pelvic or anorectal surgery: No  Family history of colon/rectal cancer: Yes - Father  Family history of IBD: No  Steroid or other immunosuppression: No  Blood thinners: No  Current stool softeners or fiber supplement: Yes - Metamucil  Active smoking: No  Prior deliveries: Yes - 6  complicated by tear: Yes     [place] (FI):  Leakage of solid stool:     Daily (4)  Leakage of liquid stool: Daily (4)  Leakage of flatus:       Usually (3)         Wear a pad:    Never (0)         Alter life:    Never (0)         Total: 11    [place] (ODS):  How long on toilet:  6-10min (1)   How many times/day: 2 (1)    Digitation:   Never (0)  Laxatives:   Never (0)   Enemas:    Never (0)   Incomplete stool:  Never (0)   Strain:    Never (0)   Total: 1    Stool consistency/[place]: 4/5  Bowel movements per month:  Daily   Leakage of urine: Yes   Trouble emptying your bladder: No    Review of patient's allergies indicates:   Allergen Reactions    Codeine Nausea And Vomiting       Past Medical  "History:   Diagnosis Date    Arthritis 2022    Diverticulosis     colonoscopy , Dr Guerrier    GERD (gastroesophageal reflux disease) 10/22/2020    gastritis EGD 2021 Dr Guerrier    History of ankle surgery 2022    Bilateral ankle fusions, Dr Garcia    HTN (hypertension), benign 2018    Hypertensive left ventricular hypertrophy, without heart failure 2018    Dr Ellsworth    Knee pain, left     eval in PT with pes planus, L foot, hip & knee pain, Dr Tirado takes Meloxicam 15 mg daily    Mixed hyperlipidemia 02/15/2018    Osteoporosis, senile     Papillary thyroid carcinoma     Dr Quinn, stage 1, no mets    Postoperative hypothyroidism 10/22/2020    Scoliosis     noted at PT    Weight gain 2022    Try metf, avoid ozempic w pap thyr CA     Past Surgical History:   Procedure Laterality Date     SECTION  10/9/1991    right foot surgery      Dr Garcia    THYROIDECTOMY      x2, Dr Quinn    TONSILLECTOMY       Family History   Problem Relation Name Age of Onset    Hypertension Mother Kelsie Maciel     Dementia Mother Kelsie Maciel     Arthritis Mother Kelsie Maciel     Colon cancer Father Juan Maciel     Heart disease Father Juan Maciel     Cancer Father Juan Maciel     Hearing loss Father Juan Maciel     Hypertension Father Juan Maciel     Stroke Father Juan Maciel     Arthritis Maternal Grandfather Haim Diez      Social History     Tobacco Use    Smoking status: Never    Smokeless tobacco: Never   Substance Use Topics    Alcohol use: Yes        Review of Systems:  ROS    OBJECTIVE:     Vital Signs (Most Recent)  BP (!) 144/76 (BP Location: Right arm, Patient Position: Sitting)   Pulse 77   Ht 5' 2" (1.575 m)   Wt 87.6 kg (193 lb 2 oz)   BMI 35.32 kg/m²     Physical Exam:  General: White female in no distress   Neuro: Alert and oriented x 4.  Moves all extremities.     HEENT: No icterus.  Trachea " midline  Respiratory: Respirations are even and unlabored  Cardiac: Regular rate  Abdomen: Soft, non-distended  Extremities: Warm dry and intact  Skin: No rashes  Anorectal: Perianal skin healthy, soft skin tags, no fissure, no masses    Anoscopy: Grade I/II internal hemorrhoids, no masses, healthy rectal mucosa    Examined with female chaperone present      ASSESSMENT/PLAN:     73yo F w/ recent abdominal pain, diarrhea, and fecal incontinence    - Stool studies  - Add probiotic (written instructions provided)  - If no improvement with above plan for colonoscopy    Rachael Chou MD  Staff Surgeon  Colon & Rectal Surgery

## 2025-01-29 NOTE — ASSESSMENT & PLAN NOTE
Began October, 11/1/24 Tx w Augmentin,   Ongoing diarrhea during her drive to [place] for Thanksgiving.   & worsening hemorrhoids   12/12 Tx w Flagyl for presumed C Diff  Has appt w CRS Dr Chou today

## 2025-01-29 NOTE — PATIENT INSTRUCTIONS
These are the types of probiotics that are helpful for diarrhea.  Ask the pharmacist to help you find them in the store.  They are over the counter medications.    Lactobacillus rhamnosus GG 10e10 cfu, twice daily   Saccharomyces boulardii [facility] I-[id number] 5 × 10e9 cfu or 250 mg, twice daily

## 2025-01-29 NOTE — ASSESSMENT & PLAN NOTE
Hgb 11.3  Colonoscopy nml 2021 Dr Guerrier    You are mildly anemic, meaning your blood count (which carries oxygen throughout your body) is LOW.      Focus on eating  more IRON RICH FOODS every day -  Red meat, soybeans (edamame), lentils, raw or cooked spinach, beef, beans (lima, navy, kidney, ayala), turkey & chicken dark meat, Oysters, chickpeas (hummus), fortified cereal, pumpkin seeds, sesame seeds

## 2025-01-29 NOTE — ASSESSMENT & PLAN NOTE
Stable , Continue Losartan 50 daily & AMlodipine 2.5 daily      Try to stop these things that can elevate blood pressure: ALCOHOL, salt, caffeine, energy drinks, diet pills, sudafed, taking NSAIDS daily (advil, alleve, ibuprofen, naproxen) and birth control  DECREASE SALT (fast foods, frozen, canned, processed foods, ham, turkey, fried foods, chips, crackers, etc) & drink 8 glasses of water a day with minimal caffeine   Your HEART is RELAXED when Blood pressure < 129/79.   Your heart thickens & weakens when BP is always > 140/90

## 2025-01-29 NOTE — ASSESSMENT & PLAN NOTE
Your Bone density test shows OSTEOPOROSIS    I recommend daily exercise & Balance training     =============  DAILY MOVEMENT (walking, carrying light weights),  BALANCE training  & STRETCHING  are the BEST ways to strengthen the bone where your muscles insert & prevent future fractures.   Intake 1200mg of  CALCIUM daily (Seeds, Cheese, Yogurt, Sardines & canned salmon, Beans & lentils, Almonds, Whey protein powder supplement, Leafy greens)  Plus 800 units of VITAMIN D daily - 10 minutes of direct sunlight or a supplement .

## 2025-01-30 ENCOUNTER — LAB VISIT (OUTPATIENT)
Dept: LAB | Facility: HOSPITAL | Age: 75
End: 2025-01-30
Attending: COLON & RECTAL SURGERY
Payer: COMMERCIAL

## 2025-01-30 DIAGNOSIS — R19.7 DIARRHEA, UNSPECIFIED TYPE: ICD-10-CM

## 2025-01-30 LAB
C DIFF GDH STL QL: NEGATIVE
C DIFF TOX A+B STL QL IA: NEGATIVE

## 2025-01-30 PROCEDURE — 87449 NOS EACH ORGANISM AG IA: CPT | Performed by: COLON & RECTAL SURGERY

## 2025-01-30 PROCEDURE — 87177 OVA AND PARASITES SMEARS: CPT | Performed by: COLON & RECTAL SURGERY

## 2025-01-30 PROCEDURE — 87449 NOS EACH ORGANISM AG IA: CPT | Mod: 91 | Performed by: COLON & RECTAL SURGERY

## 2025-01-30 PROCEDURE — 87046 STOOL CULTR AEROBIC BACT EA: CPT | Performed by: COLON & RECTAL SURGERY

## 2025-01-30 PROCEDURE — 87045 FECES CULTURE AEROBIC BACT: CPT | Performed by: COLON & RECTAL SURGERY

## 2025-01-30 PROCEDURE — 87427 SHIGA-LIKE TOXIN AG IA: CPT | Performed by: COLON & RECTAL SURGERY

## 2025-01-31 LAB
E COLI SXT1 STL QL IA: NEGATIVE
E COLI SXT2 STL QL IA: NEGATIVE

## 2025-02-01 LAB — BACTERIA STL CULT: NORMAL

## 2025-02-05 LAB — O+P STL MICRO: NORMAL

## 2025-02-19 DIAGNOSIS — Z78.0 MENOPAUSE: ICD-10-CM

## 2025-02-25 ENCOUNTER — OFFICE VISIT (OUTPATIENT)
Dept: CARDIOLOGY | Facility: CLINIC | Age: 75
End: 2025-02-25
Payer: COMMERCIAL

## 2025-02-25 VITALS
BODY MASS INDEX: 35.41 KG/M2 | HEART RATE: 72 BPM | HEIGHT: 62 IN | DIASTOLIC BLOOD PRESSURE: 84 MMHG | SYSTOLIC BLOOD PRESSURE: 126 MMHG | WEIGHT: 192.44 LBS

## 2025-02-25 DIAGNOSIS — R93.1 AGATSTON CORONARY ARTERY CALCIUM SCORE BETWEEN 100 AND 400: ICD-10-CM

## 2025-02-25 DIAGNOSIS — I10 PRIMARY HYPERTENSION: ICD-10-CM

## 2025-02-25 LAB
OHS QRS DURATION: 90 MS
OHS QTC CALCULATION: 438 MS

## 2025-02-25 PROCEDURE — 99213 OFFICE O/P EST LOW 20 MIN: CPT | Mod: S$GLB,,, | Performed by: INTERNAL MEDICINE

## 2025-02-25 PROCEDURE — 3288F FALL RISK ASSESSMENT DOCD: CPT | Mod: CPTII,S$GLB,, | Performed by: INTERNAL MEDICINE

## 2025-02-25 PROCEDURE — 1101F PT FALLS ASSESS-DOCD LE1/YR: CPT | Mod: CPTII,S$GLB,, | Performed by: INTERNAL MEDICINE

## 2025-02-25 PROCEDURE — 1126F AMNT PAIN NOTED NONE PRSNT: CPT | Mod: CPTII,S$GLB,, | Performed by: INTERNAL MEDICINE

## 2025-02-25 PROCEDURE — 93000 ELECTROCARDIOGRAM COMPLETE: CPT | Mod: S$GLB,,, | Performed by: INTERNAL MEDICINE

## 2025-02-25 PROCEDURE — 3079F DIAST BP 80-89 MM HG: CPT | Mod: CPTII,S$GLB,, | Performed by: INTERNAL MEDICINE

## 2025-02-25 PROCEDURE — 1159F MED LIST DOCD IN RCRD: CPT | Mod: CPTII,S$GLB,, | Performed by: INTERNAL MEDICINE

## 2025-02-25 PROCEDURE — 99999 PR PBB SHADOW E&M-EST. PATIENT-LVL III: CPT | Mod: PBBFAC,,, | Performed by: INTERNAL MEDICINE

## 2025-02-25 PROCEDURE — 3074F SYST BP LT 130 MM HG: CPT | Mod: CPTII,S$GLB,, | Performed by: INTERNAL MEDICINE

## 2025-02-25 NOTE — PROGRESS NOTES
"  Subjective:   Chief Complaint:  MaryLee B Harris is a 74 y.o. female who presents for follow-up of Hyperlipidemia      Problem List and HPI:   Hypertension  Hypercholesterolemia   CACS ~200 (2/2018)  Thyroid cancer s/p thyroidectomy  Insulin resistance - on metformin  Colonic polyps  Family h/o colon cancer  Family h/o heart disease    In 11/2024 she had diverticulitis and was treated w antibiotics. She plans to undergo a colonoscopy w Dr. Chou soon.  Started exercising recently - brisk walk for 20 min      Review of patient's allergies indicates:   Allergen Reactions    Codeine Nausea And Vomiting        Current Outpatient Medications   Medication Sig    albuterol (PROVENTIL) 2.5 mg /3 mL (0.083 %) nebulizer solution Take 2.5 mg by nebulization every 4 (four) hours as needed.    amLODIPine (NORVASC) 2.5 MG tablet TAKE 1 TABLET BY MOUTH EVERY DAY    atorvastatin (LIPITOR) 20 MG tablet TAKE 1 TABLET DAILY    calcium-vitamin D3-vitamin K 650 mg-12.5 mcg-40 mcg Chew Take 1 tablet by mouth once daily.    fluticasone (FLONASE) 50 mcg/actuation nasal spray instill 2 sprays into each nostril once daily (Patient taking differently: 2 sprays as needed.)    hydrocortisone 2.5 % cream Apply topically 2 (two) times daily. For hemorrhoids (Patient taking differently: Apply topically 2 (two) times daily as needed. For hemorrhoids)    levothyroxine (SYNTHROID) 150 MCG tablet Take 150 mcg by mouth before breakfast.    losartan (COZAAR) 50 MG tablet TAKE 1 TABLET DAILY    metformin HCl (METFORMIN ORAL) Take 500 mg by mouth once daily.         Social history:  MaryLee B Harris  reports that she has never smoked. She has never used smokeless tobacco. She reports current alcohol use.      Objective:   /84   Pulse 72   Ht 5' 2" (1.575 m)   Wt 87.3 kg (192 lb 7.4 oz)   BMI 35.20 kg/m²    Physical Exam  Constitutional:       Appearance: Normal appearance. She is well-developed.   Neck:      Vascular: No JVD. "   Cardiovascular:      Rate and Rhythm: Normal rate and regular rhythm.      Pulses:           Radial pulses are 2+ on the right side and 2+ on the left side.      Heart sounds: S1 normal and S2 normal. No murmur heard.  Pulmonary:      Breath sounds: No decreased breath sounds, wheezing or rales.   Chest:      Chest wall: There is no dullness to percussion.   Abdominal:      Palpations: Abdomen is soft. There is no hepatomegaly or splenomegaly.      Tenderness: There is no abdominal tenderness.   Musculoskeletal:      Right lower leg: No edema.      Left lower leg: No edema.   Skin:     General: Skin is warm.      Findings: No bruising.      Nails: There is no clubbing.   Neurological:      Mental Status: She is alert and oriented to person, place, and time.   Psychiatric:         Speech: Speech normal.         Behavior: Behavior normal.         Component      Latest Ref Rng 11/21/2022 1/22/2024 2/19/2024 1/17/2025   Cholesterol Total      120 - 199 mg/dL 133  141  135  117 (L)    Triglycerides      30 - 150 mg/dL 59  72  78  68    HDL      40 - 75 mg/dL 49  49  48  39 (L)    LDL Cholesterol      63.0 - 159.0 mg/dL 72.2  77.6  71.4  64.4    HDL/Cholesterol Ratio      20.0 - 50.0 % 36.8  34.8  35.6  33.3         Stress echo 2021  The patient reached the end of the protocol.  There were no arrhythmias during stress.  The ECG portion of this study is negative for myocardial ischemia.  The left ventricle is normal in size with concentric remodeling and normal systolic function. The estimated ejection fraction is 63%  Normal left ventricular diastolic function.  Normal right ventricular size with normal right ventricular systolic function.  Mild right atrial enlargement.  Normal central venous pressure (3 mmHg).  The estimated PA systolic pressure is 24 mmHg.  The stress echo portion of this study is negative for myocardial ischemia.      Assessment and Plan:       ICD-10-CM ICD-9-CM   1. Primary hypertension  I10 401.9    2. Agatston coronary artery calcium score between 100 and 400  R93.1 793.2        Hypertension well controlled. Continue same medications for hypertension. Low sodium diet.    Subclinical CAD. No angina.   LDL at goal.    Orders placed during this encounter:     Primary hypertension  -     SCHEDULED EKG 12-LEAD (to [place])    Agatston coronary artery calcium score between 100 and 400  -     SCHEDULED EKG 12-LEAD (to [place])         Follow up in about 1 year (around 2/25/2026) for HTN w usual labs - w KATHERINE if OK w patient.

## 2025-02-25 NOTE — PATIENT INSTRUCTIONS
Component      Latest Ref Rng 6/16/2022 11/21/2022 1/22/2024 2/19/2024 1/17/2025   Cholesterol Total      120 - 199 mg/dL 139  133  141  135  117 (L)    Triglycerides      30 - 150 mg/dL 60  59  72  78  68    HDL      40 - 75 mg/dL 57  49  49  48  39 (L)    LDL Cholesterol      63.0 - 159.0 mg/dL 70.0  72.2  77.6  71.4  64.4    HDL/Cholesterol Ratio      20.0 - 50.0 % 41.0  36.8  34.8  35.6  33.3

## 2025-03-20 RX ORDER — ATORVASTATIN CALCIUM 20 MG/1
20 TABLET, FILM COATED ORAL
Qty: 90 TABLET | Refills: 3 | Status: SHIPPED | OUTPATIENT
Start: 2025-03-20

## 2025-03-20 NOTE — TELEPHONE ENCOUNTER
Refill Decision Note   MaryLee B Harris  is requesting a refill authorization.  Brief Assessment and Rationale for Refill:  Approve     Medication Therapy Plan:         Comments:     Note composed:12:07 PM 03/20/2025

## 2025-04-03 DIAGNOSIS — I11.9 HYPERTENSIVE LEFT VENTRICULAR HYPERTROPHY, WITHOUT HEART FAILURE: ICD-10-CM

## 2025-04-03 NOTE — TELEPHONE ENCOUNTER
No care due was identified.  [facility] Embedded Care Due Messages. Reference number: [id number].   4/03/2025 3:52:54 PM CDT

## 2025-04-04 RX ORDER — LOSARTAN POTASSIUM 50 MG/1
50 TABLET ORAL DAILY
Qty: 90 TABLET | Refills: 2 | Status: SHIPPED | OUTPATIENT
Start: 2025-04-04

## 2025-07-17 ENCOUNTER — PATIENT MESSAGE (OUTPATIENT)
Dept: PRIMARY CARE CLINIC | Facility: CLINIC | Age: 75
End: 2025-07-17
Payer: COMMERCIAL

## 2025-07-17 DIAGNOSIS — Z01.00 NORMAL EYE EXAM: Primary | ICD-10-CM

## 2025-07-18 ENCOUNTER — TELEPHONE (OUTPATIENT)
Dept: OPHTHALMOLOGY | Facility: CLINIC | Age: 75
End: 2025-07-18
Payer: COMMERCIAL

## 2025-07-24 ENCOUNTER — APPOINTMENT (OUTPATIENT)
Dept: RADIOLOGY | Facility: CLINIC | Age: 75
End: 2025-07-24
Attending: FAMILY MEDICINE
Payer: COMMERCIAL

## 2025-07-24 DIAGNOSIS — Z78.0 MENOPAUSE: ICD-10-CM

## 2025-07-24 PROCEDURE — 77080 DXA BONE DENSITY AXIAL: CPT | Mod: TC,PO

## 2025-07-24 PROCEDURE — 77080 DXA BONE DENSITY AXIAL: CPT | Mod: 26,,, | Performed by: INTERNAL MEDICINE
